# Patient Record
Sex: MALE | Race: WHITE | NOT HISPANIC OR LATINO | Employment: FULL TIME | URBAN - METROPOLITAN AREA
[De-identification: names, ages, dates, MRNs, and addresses within clinical notes are randomized per-mention and may not be internally consistent; named-entity substitution may affect disease eponyms.]

---

## 2018-01-31 ENCOUNTER — HOSPITAL ENCOUNTER (OUTPATIENT)
Dept: RADIOLOGY | Facility: OTHER | Age: 68
Discharge: HOME | End: 2018-01-31
Attending: INTERNAL MEDICINE
Payer: COMMERCIAL

## 2018-01-31 DIAGNOSIS — R63.4 ABNORMAL WEIGHT LOSS: ICD-10-CM

## 2018-01-31 DIAGNOSIS — R63.4 ABNORMAL WEIGHT LOSS: Primary | ICD-10-CM

## 2018-01-31 PROCEDURE — 71046 X-RAY EXAM CHEST 2 VIEWS: CPT | Mod: TC,FY

## 2018-01-31 PROCEDURE — 71046 X-RAY EXAM CHEST 2 VIEWS: CPT | Mod: 26,,, | Performed by: RADIOLOGY

## 2018-09-05 ENCOUNTER — OFFICE VISIT (OUTPATIENT)
Dept: SPORTS MEDICINE | Facility: CLINIC | Age: 68
End: 2018-09-05
Payer: COMMERCIAL

## 2018-09-05 VITALS
DIASTOLIC BLOOD PRESSURE: 72 MMHG | HEART RATE: 62 BPM | WEIGHT: 185 LBS | BODY MASS INDEX: 25.06 KG/M2 | HEIGHT: 72 IN | SYSTOLIC BLOOD PRESSURE: 142 MMHG

## 2018-09-05 DIAGNOSIS — M19.012 BILATERAL SHOULDER REGION ARTHRITIS: Primary | ICD-10-CM

## 2018-09-05 DIAGNOSIS — M19.012 GLENOHUMERAL ARTHRITIS, LEFT: ICD-10-CM

## 2018-09-05 DIAGNOSIS — M19.011 GLENOHUMERAL ARTHRITIS, RIGHT: ICD-10-CM

## 2018-09-05 DIAGNOSIS — M19.011 BILATERAL SHOULDER REGION ARTHRITIS: Primary | ICD-10-CM

## 2018-09-05 PROCEDURE — 99204 OFFICE O/P NEW MOD 45 MIN: CPT | Mod: S$GLB,,, | Performed by: ORTHOPAEDIC SURGERY

## 2018-09-05 PROCEDURE — 99999 PR PBB SHADOW E&M-EST. PATIENT-LVL III: CPT | Mod: PBBFAC,,, | Performed by: ORTHOPAEDIC SURGERY

## 2018-09-05 PROCEDURE — 1101F PT FALLS ASSESS-DOCD LE1/YR: CPT | Mod: CPTII,S$GLB,, | Performed by: ORTHOPAEDIC SURGERY

## 2018-09-05 NOTE — PROGRESS NOTES
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 Randolph Ibrahim, reports that the pain is severe and not responding to any conservative care.    He points to the issa-lateral shoulders as the location of his pain. Pain has been present for several years. He is an avid weight  and former heavy weight . Has had multiple PRP and mesenchymal stem cell injections into the bilateral shoulders. He reports that the pain is worse with overhead activity. It also bothers him at night. The patient reports he has known shoulder arthritis and would like to return to sport as soon as possible with as little down time as possible.     Is affecting ADLs.     He was previously seen by Dr. Keanu Tang who recommended humeral head resurfacing to optimize his return to competition.     Review of Systems   Constitution: Negative. Negative for chills, fever and night sweats.   HENT: Negative for congestion and headaches.    Eyes: Negative for blurred vision, left vision loss and right vision loss.   Cardiovascular: Negative for chest pain and syncope.   Respiratory: Negative for cough and shortness of breath.    Endocrine: Negative for polydipsia, polyphagia and polyuria.   Hematologic/Lymphatic: Negative for bleeding problem. Does not bruise/bleed easily.   Skin: Negative for dry skin, itching and rash.   Musculoskeletal: Negative for falls and muscle weakness.   Gastrointestinal: Negative for abdominal pain and bowel incontinence.   Genitourinary: Negative for bladder incontinence and nocturia.   Neurological: Negative for disturbances in coordination, loss of balance and seizures.   Psychiatric/Behavioral: Negative for depression. The patient does not have insomnia.    Allergic/Immunologic: Negative for hives and persistent infections.     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    Empty sella     Family history of ischemic heart disease 1/14/2014    Hypogonadism male 2004    Hypothyroidism 1/14/2014    Diagnosed 2003.    Nonspecific abnormal results of endocrine function study 6/20/2014     PAST SURGICAL HISTORY:   Past Surgical History:   Procedure Laterality Date    HAIR TRANSPLANT      TONSILLECTOMY, ADENOIDECTOMY  1957     FAMILY HISTORY:   Family History   Problem Relation Age of Onset    Cancer Mother 58        breast mets to lung    Heart disease Father     Cancer Sister     Cancer Brother         hodgkins lymphoma    Diabetes Son         type 1    Cancer Brother         bladder     SOCIAL HISTORY:   Social History     Socioeconomic History    Marital status:      Spouse name: Not on file    Number of children: Not on file    Years of education: Not on file    Highest education level: Not on file   Social Needs    Financial resource strain: Not on file    Food insecurity - worry: Not on file    Food insecurity - inability: Not on file    Transportation needs - medical: Not on file    Transportation needs - non-medical: Not on file   Occupational History    Not on file   Tobacco Use    Smoking status: Never Smoker   Substance and Sexual Activity    Alcohol use: Yes     Alcohol/week: 3.6 oz     Types: 4 Glasses of wine, 2 Shots of liquor per week     Comment: Socially.    Drug use: No    Sexual activity: Yes     Partners: Female   Other Topics Concern    Not on file   Social History Narrative    Exercises (weight lifting). Very little cardio. 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 , Disp: , Rfl: 12    multivitamin-zinc oxide (ADEKS) 7.5 mg Chew, Take 1 tablet by mouth once daily. Life extension mix, Disp: , Rfl:      selenium 200 mcg Tab, Take 2 tablets by mouth once daily., Disp: , Rfl:     testosterone cypionate (DEPOTESTOTERONE CYPIONATE) 100 mg/mL injection, Inject 100 mg into the muscle once a week. , Disp: , Rfl:     tyrosine 500 mg Cap, Take 1,000 mcg by mouth once daily., Disp: , Rfl:     anastrozole (ARIMIDEX) 1 mg Tab, Take 1 tablet by mouth once daily., Disp: , Rfl:     atorvastatin (LIPITOR) 20 MG tablet, Take 1 tablet (20 mg total) by mouth once daily., Disp: 90 tablet, Rfl: 3    AVODART 0.5 mg capsule, Take 1 capsule by mouth once daily., Disp: , Rfl:     BORON ORAL, Take 6 mg by mouth once daily., Disp: , Rfl:     cholecalciferol, vitamin D3, (VITAMIN D3) 5,000 unit Tab, Take 5,000 Units by mouth once daily., Disp: , Rfl:     coenzyme Q10 (CO Q-10) 400 mg Cap, Take 1 capsule by mouth once daily., Disp: , Rfl:     fish oil-fat acid comb.8-hb137 (OMEGA 3-6-9) 1,200 mg Cap, Take 1 capsule by mouth once daily., Disp: , Rfl:     GREEN TEA LEAF & TEA LEAF EXTR (GREEN TEA COMPLEX ORAL), Take by mouth., Disp: , Rfl:     hydrocodone-acetaminophen 5-325mg (NORCO) 5-325 mg per tablet, , Disp: , Rfl: 0    levofloxacin (LEVAQUIN) 500 MG tablet, Take 500 mg by mouth once daily., Disp: , Rfl: 1    lorazepam (ATIVAN) 1 MG tablet, Take 1 mg by mouth every 6 (six) hours as needed., Disp: , Rfl:     metformin (GLUCOPHAGE) 1000 MG tablet, Take 1 tablet by mouth 2 (two) times daily., Disp: , Rfl:     methylPREDNISolone (MEDROL DOSEPACK) 4 mg tablet, , Disp: , Rfl: 0    milk thistle 175 mg tablet, Take 175 mg by mouth once daily., Disp: , Rfl:     oxycodone-acetaminophen (PERCOCET)  mg per tablet, Take 1 tablet by mouth every 4 to 6 hours as needed., Disp: , Rfl:     thyroid, pork, (ARMOUR THYROID) 120 mg Tab, 135 mg daily (2 and a quarter tabs), Disp: 70 tablet, Rfl: 12    tretinoin (RETIN-A) 0.1 % cream, , Disp: , Rfl: 3    triamcinolone acetonide 0.1% (KENALOG) 0.1 % cream, Apply 1 application  topically 2 (two) times daily., Disp: , Rfl:     UNABLE TO FIND, medication name: adrenal DMG supplement; sea iodine 1000mcg supplement; celegen supplement., Disp: , Rfl:     zinc gluconate 50 mg tablet, Take 50 mg by mouth once daily., Disp: , Rfl:     zolpidem (AMBIEN) 10 mg Tab, Take 1 tablet by mouth nightly as needed., Disp: , Rfl:   ALLERGIES: Review of patient's allergies indicates:  No Known Allergies    VITAL SIGNS: BP (!) 142/72   Pulse 62   Ht 6' (1.829 m)   Wt 83.9 kg (185 lb)   BMI 25.09 kg/m²      PHYSICAL EXAMINATION:  General:  The patient is alert and oriented x 3.  Mood is pleasant.  Observation of ears, eyes and nose reveal no gross abnormalities.  No labored breathing observed.  Gait is coordinated. Patient can toe walk and heel walk without difficulty.      bilateral Shoulder / Upper Extremity Exam    OBSERVATION:     Swelling  none  Deformity  none   Discoloration  none   Scapular winging none   Scars   none  Atrophy  none    TENDERNESS / CREPITUS (T/C):          T/C      T/C   Clavicle   -/-  SUPRAspinatus    -/-     AC Jt.    -/-  INFRAspinatus  -/-    SC Jt.    -/-  Deltoid    -/-      G. Tuberosity  -/-  LH BICEP groove  Mild +/-   Acromion:  -/-  Midline Neck   -/-     Scapular Spine -/-  Trapezium   -/-   SMA Scapula  -/-  GH jt. line - post  -/-     Scapulothoracic  -/-         ROM: (* = with pain)  Right shoulder   Left shoulder        AROM (PROM)   AROM (PROM)   FE    110° (110°)     110° (110°)     ER at 0°    10°  (10°)    10°  (10°)    ER at 90° ABD  90°  (90°)    90°  (90°)    IR at 90°  ABD   NA  (40°)     NA  (40°)      IR (spine level)   L5     L5     STRENGTH: (* = with pain) RIGHT SHOULDER  LEFT SHOULDER   SCAPTION at 0  5/5    5/5   SCAPTION at 30  5/5    5/5    IR    5/5    5/5   ER    5/5    5/5   BICEPS   5/5    5/5   Deltoid    5/5    5/5     SIGNS:  Painful side       NEER   +    OVALERIAS  neg    AVILES   +    SPEEDS  neg     DROP ARM   neg   BELLY  PRESS neg   Superior escape none    LIFT-OFF  neg   X-Body ADD    neg    MOVING VALGUS neg        STABILITY TESTING    RIGHT SHOULDER   LEFT SHOULDER     Translation     Anterior  up face     up face    Posterior  up face    up face    Sulcus   < 10mm    < 10 mm     Signs   Apprehension   neg      neg       Relocation   no change     no change      Jerk test  neg     neg    EXTREMITY NEURO-VASCULAR EXAM    Sensation grossly intact to light touch all dermatomal regions.    DTR 2+ Biceps, Triceps, BR and Negative Ludmilas sign   Grossly intact motor function at Elbow, Wrist and Hand   Distal pulses radial and ulnar 2+, brisk cap refill, symmetric.      NECK:  Painless FROM and spinous processes non-tender. Negative Spurlings sign.      OTHER FINDINGS:  Crepitation with shoulder ROM    XRAYS:  Shoulder trauma series bilateral,  were obtained and reviewed  No convincing fracture or dislocation is noted. The osseous structures appear well mineralized and well aligned    MRI:  Bilateral outside MRI reviewed by me demonstrating severe glenohumeral arthritis with intact rotator cuff muscles.         ASSESSMENT:    Bilateral shoulder osteoarthritis with intact rotator cuff     PLAN:        We discussed both operative and non operative treatment options  Given the patient's desire to return to weight lifting, we recommended resurfacing of the glenoid and humeral head simultaneously to address both sources of the patients pain. We would use [employer] implants and instrumentation   We explained that this would improve his pain and range of motion.   We also discussed possible Bio-D injection to help with pain relief  We discussed anatomic total shoulder arthroplasty, however we explained that he would have post operative limitations include limiting weight lifting to under 50 lbs.   He would sling immobilization for 6 weeks and require rehab. He would likely be able to return to weight lifting in 3-4 months    He would like  to proceed with Bio-D injection. We will get this scheduled and contact the patient to arrange this.

## 2018-09-05 NOTE — PROGRESS NOTES
Bilateral shoulder osteoarthritis with intact rotator cuff     We discussed both operative and non operative treatment options  Given the patient's desire to return to weight lifting, we recommended resurfacing of the glenoid and humeral head simultaneously to address both sources of the patients pain.   We explained that this would improve his pain and range of motion.   We also discussed possible Bio-D injection to help with pain relief  We discussed anatomic total shoulder arthroplasty, however we explained that he would have post operative limitations include limiting weight lifting to under 50 lbs.   He would sling immobilization for 6 weeks and require rehab. He would likely be able to return to weight lifting in 3-4 months    He would like to proceed with Bio-D injection. We will get this scheduled and contact the patient to arrange this.

## 2018-09-19 ENCOUNTER — OFFICE VISIT (OUTPATIENT)
Dept: SPORTS MEDICINE | Facility: CLINIC | Age: 68
End: 2018-09-19
Payer: COMMERCIAL

## 2018-09-19 ENCOUNTER — HOSPITAL ENCOUNTER (OUTPATIENT)
Dept: RADIOLOGY | Facility: HOSPITAL | Age: 68
Discharge: HOME | End: 2018-09-19
Attending: ORTHOPAEDIC SURGERY
Payer: COMMERCIAL

## 2018-09-19 VITALS
DIASTOLIC BLOOD PRESSURE: 61 MMHG | BODY MASS INDEX: 25.06 KG/M2 | SYSTOLIC BLOOD PRESSURE: 129 MMHG | HEIGHT: 72 IN | WEIGHT: 185 LBS | HEART RATE: 65 BPM

## 2018-09-19 DIAGNOSIS — M25.511 BILATERAL SHOULDER PAIN, UNSPECIFIED CHRONICITY: ICD-10-CM

## 2018-09-19 DIAGNOSIS — M25.512 BILATERAL SHOULDER PAIN, UNSPECIFIED CHRONICITY: ICD-10-CM

## 2018-09-19 DIAGNOSIS — M19.012 OSTEOARTHRITIS OF BOTH SHOULDERS, UNSPECIFIED OSTEOARTHRITIS TYPE: ICD-10-CM

## 2018-09-19 DIAGNOSIS — M25.512 BILATERAL SHOULDER PAIN, UNSPECIFIED CHRONICITY: Primary | ICD-10-CM

## 2018-09-19 DIAGNOSIS — M19.011 OSTEOARTHRITIS OF BOTH SHOULDERS, UNSPECIFIED OSTEOARTHRITIS TYPE: ICD-10-CM

## 2018-09-19 DIAGNOSIS — M25.511 BILATERAL SHOULDER PAIN, UNSPECIFIED CHRONICITY: Primary | ICD-10-CM

## 2018-09-19 PROCEDURE — 73020 X-RAY EXAM OF SHOULDER: CPT | Mod: 26,LT,, | Performed by: RADIOLOGY

## 2018-09-19 PROCEDURE — 73020 X-RAY EXAM OF SHOULDER: CPT | Mod: TC,FY,PO,RT

## 2018-09-19 PROCEDURE — 73020 X-RAY EXAM OF SHOULDER: CPT | Mod: TC,FY,PO,LT

## 2018-09-19 PROCEDURE — 73020 X-RAY EXAM OF SHOULDER: CPT | Mod: 26,RT,, | Performed by: RADIOLOGY

## 2018-09-19 PROCEDURE — 99999 PR PBB SHADOW E&M-EST. PATIENT-LVL V: CPT | Mod: PBBFAC,,, | Performed by: ORTHOPAEDIC SURGERY

## 2018-09-19 NOTE — PROGRESS NOTES
Bilateral shoulder osteoarthritis     PROCEDURE NOTE:   After consent and post-injection information was given, the shoulder was prepped with betadyne. The bilateral glenohumeral joint of the shoulder was injected with 1 cc BioD and 5 cc saline under US guidance. Bandaid was applied. Patient was reminded to rest and to call clinic immediately for any adverse side effects as explained in clinic today.    All questions were answered, pt will contact us for questions or concerns in the interim.

## 2018-11-22 ENCOUNTER — HOSPITAL ENCOUNTER (EMERGENCY)
Facility: HOSPITAL | Age: 68
Discharge: HOME | End: 2018-11-23
Attending: EMERGENCY MEDICINE
Payer: COMMERCIAL

## 2018-11-22 DIAGNOSIS — R00.2 PALPITATIONS: ICD-10-CM

## 2018-11-22 DIAGNOSIS — I47.10 SVT (SUPRAVENTRICULAR TACHYCARDIA): Primary | ICD-10-CM

## 2018-11-22 DIAGNOSIS — R00.0 TACHYCARDIA: ICD-10-CM

## 2018-11-22 DIAGNOSIS — R07.9 CHEST PAIN: ICD-10-CM

## 2018-11-22 PROCEDURE — 96374 THER/PROPH/DIAG INJ IV PUSH: CPT

## 2018-11-22 PROCEDURE — 93005 ELECTROCARDIOGRAM TRACING: CPT

## 2018-11-22 PROCEDURE — 99284 EMERGENCY DEPT VISIT MOD MDM: CPT | Mod: 25

## 2018-11-22 PROCEDURE — 96361 HYDRATE IV INFUSION ADD-ON: CPT

## 2018-11-22 PROCEDURE — 93010 ELECTROCARDIOGRAM REPORT: CPT | Mod: ,,, | Performed by: INTERNAL MEDICINE

## 2018-11-22 PROCEDURE — 96376 TX/PRO/DX INJ SAME DRUG ADON: CPT

## 2018-11-23 VITALS
HEART RATE: 58 BPM | SYSTOLIC BLOOD PRESSURE: 110 MMHG | TEMPERATURE: 98 F | OXYGEN SATURATION: 97 % | HEIGHT: 72 IN | RESPIRATION RATE: 18 BRPM | DIASTOLIC BLOOD PRESSURE: 54 MMHG | BODY MASS INDEX: 25.06 KG/M2 | WEIGHT: 185 LBS

## 2018-11-23 LAB
ALBUMIN SERPL BCP-MCNC: 3.9 G/DL
ALP SERPL-CCNC: 46 U/L
ALT SERPL W/O P-5'-P-CCNC: 36 U/L
ANION GAP SERPL CALC-SCNC: 9 MMOL/L
AST SERPL-CCNC: 33 U/L
BASOPHILS # BLD AUTO: 0.05 K/UL
BASOPHILS NFR BLD: 0.5 %
BILIRUB SERPL-MCNC: 0.3 MG/DL
BUN SERPL-MCNC: 21 MG/DL
CALCIUM SERPL-MCNC: 9.8 MG/DL
CHLORIDE SERPL-SCNC: 104 MMOL/L
CO2 SERPL-SCNC: 27 MMOL/L
CREAT SERPL-MCNC: 1.5 MG/DL
DIFFERENTIAL METHOD: ABNORMAL
EOSINOPHIL # BLD AUTO: 0.4 K/UL
EOSINOPHIL NFR BLD: 4.6 %
ERYTHROCYTE [DISTWIDTH] IN BLOOD BY AUTOMATED COUNT: 16 %
EST. GFR  (AFRICAN AMERICAN): 55 ML/MIN/1.73 M^2
EST. GFR  (NON AFRICAN AMERICAN): 47 ML/MIN/1.73 M^2
GLUCOSE SERPL-MCNC: 102 MG/DL
HCT VFR BLD AUTO: 46.4 %
HGB BLD-MCNC: 15.5 G/DL
LYMPHOCYTES # BLD AUTO: 1.8 K/UL
LYMPHOCYTES NFR BLD: 19.5 %
MCH RBC QN AUTO: 27.4 PG
MCHC RBC AUTO-ENTMCNC: 33.4 G/DL
MCV RBC AUTO: 82 FL
MONOCYTES # BLD AUTO: 1 K/UL
MONOCYTES NFR BLD: 10.3 %
NEUTROPHILS # BLD AUTO: 6 K/UL
NEUTROPHILS NFR BLD: 65 %
PLATELET # BLD AUTO: 194 K/UL
PMV BLD AUTO: 10.4 FL
POTASSIUM SERPL-SCNC: 4.3 MMOL/L
PROT SERPL-MCNC: 6.7 G/DL
RBC # BLD AUTO: 5.65 M/UL
SODIUM SERPL-SCNC: 140 MMOL/L
TROPONIN I SERPL DL<=0.01 NG/ML-MCNC: 0.02 NG/ML
TSH SERPL DL<=0.005 MIU/L-ACNC: 2.85 UIU/ML
WBC # BLD AUTO: 9.19 K/UL

## 2018-11-23 PROCEDURE — 63600175 PHARM REV CODE 636 W HCPCS: Performed by: EMERGENCY MEDICINE

## 2018-11-23 PROCEDURE — 80053 COMPREHEN METABOLIC PANEL: CPT

## 2018-11-23 PROCEDURE — 93005 ELECTROCARDIOGRAM TRACING: CPT

## 2018-11-23 PROCEDURE — 84484 ASSAY OF TROPONIN QUANT: CPT

## 2018-11-23 PROCEDURE — 93010 ELECTROCARDIOGRAM REPORT: CPT | Mod: ,,, | Performed by: INTERNAL MEDICINE

## 2018-11-23 PROCEDURE — 85025 COMPLETE CBC W/AUTO DIFF WBC: CPT

## 2018-11-23 PROCEDURE — 84443 ASSAY THYROID STIM HORMONE: CPT

## 2018-11-23 PROCEDURE — 25000003 PHARM REV CODE 250: Performed by: EMERGENCY MEDICINE

## 2018-11-23 RX ORDER — LORAZEPAM 2 MG/ML
0.5 INJECTION INTRAMUSCULAR
Status: COMPLETED | OUTPATIENT
Start: 2018-11-23 | End: 2018-11-23

## 2018-11-23 RX ORDER — METOPROLOL TARTRATE 1 MG/ML
5 INJECTION, SOLUTION INTRAVENOUS
Status: DISCONTINUED | OUTPATIENT
Start: 2018-11-23 | End: 2018-11-23

## 2018-11-23 RX ORDER — METOPROLOL TARTRATE 1 MG/ML
5 INJECTION, SOLUTION INTRAVENOUS
Status: DISCONTINUED | OUTPATIENT
Start: 2018-11-23 | End: 2018-11-23 | Stop reason: HOSPADM

## 2018-11-23 RX ORDER — SODIUM CHLORIDE 9 MG/ML
1000 INJECTION, SOLUTION INTRAVENOUS
Status: COMPLETED | OUTPATIENT
Start: 2018-11-23 | End: 2018-11-23

## 2018-11-23 RX ORDER — LORAZEPAM 0.5 MG/1
0.5 TABLET ORAL EVERY 8 HOURS PRN
Qty: 15 TABLET | Refills: 0 | Status: SHIPPED | OUTPATIENT
Start: 2018-11-23 | End: 2018-12-23

## 2018-11-23 RX ADMIN — LORAZEPAM 0.5 MG: 2 INJECTION INTRAMUSCULAR; INTRAVENOUS at 01:11

## 2018-11-23 RX ADMIN — LORAZEPAM 0.5 MG: 2 INJECTION INTRAMUSCULAR; INTRAVENOUS at 12:11

## 2018-11-23 RX ADMIN — SODIUM CHLORIDE 1000 ML: 0.9 INJECTION, SOLUTION INTRAVENOUS at 12:11

## 2018-11-23 NOTE — ED TRIAGE NOTES
Pt to ED with complaints of heart palpitations that started around 8:30pm tonight after getting upset about a personal matter.  Denies dizziness, chest pain, lightheadiness, or SOB.

## 2018-11-23 NOTE — ED PROVIDER NOTES
"Encounter Date: 11/22/2018    SCRIBE #1 NOTE: I, Sonia Kothari, am scribing for, and in the presence of,  Tere Mendez MD. I have scribed the following portions of the note - Other sections scribed: HPI, ROS, PE.       History     Chief Complaint   Patient presents with    Palpitations     Pt reports "Heart palpitations that started about 2030 tonight".  Does report "Heart issues".      CC: Palpitations    HPI: This is a 69 y/o male with PMHx of Empty sella, Hypogonadism male, and Hypothyroidism who presents to the ED for emergent evaluation of acute onset palpitations that began ar 8:30 PM today. Pt denies any pain. Pt states that he was put on a cardiac monitor by cardiologist, Dr. Tru Martinez in [place], [place], for 1 week and just recently taken off. Pt states that his next appt is on 11/26 to discuss findings from monitoring. Pt believes that he has SVT. Dr. Martinez recently increased pt's Metoprolol to 50 mg BID. Pt reports that he also took 20 mg Micardis x2 hrs PTA due to seeing that his BP was elevated. Pt notes that he only takes Micardis as needed. Pt denies SOB, dizziness, chest pain, or SOB. No further symptoms reported.      The history is provided by the patient. No  was used.     Review of patient's allergies indicates:  No Known Allergies  Past Medical History:   Diagnosis Date    Androgens and anabolic congeners causing adverse effect in therapeutic use     Chest pain, unspecified 1/14/2014 12/2013: Left sided chest pain.    Empty sella     Family history of ischemic heart disease 1/14/2014    Hypogonadism male 2004    Hypothyroidism 1/14/2014    Diagnosed 2003.    Nonspecific abnormal results of endocrine function study 6/20/2014     Past Surgical History:   Procedure Laterality Date    HAIR TRANSPLANT      TONSILLECTOMY, ADENOIDECTOMY  1957     Family History   Problem Relation Age of Onset    Cancer Mother 58        breast mets to lung    Heart disease " Father     Cancer Sister     Cancer Brother         hodgkins lymphoma    Diabetes Son         type 1    Cancer Brother         bladder     Social History     Tobacco Use    Smoking status: Never Smoker    Smokeless tobacco: Never Used   Substance Use Topics    Alcohol use: Yes     Alcohol/week: 3.6 oz     Types: 4 Glasses of wine, 2 Shots of liquor per week     Comment: Socially.    Drug use: No     Review of Systems   Constitutional: Negative for chills and fever.   HENT: Negative for congestion, ear pain, rhinorrhea and sore throat.    Eyes: Negative for pain and visual disturbance.   Respiratory: Negative for cough, choking, chest tightness and shortness of breath.    Cardiovascular: Positive for palpitations. Negative for chest pain and leg swelling.   Gastrointestinal: Negative for abdominal pain, diarrhea, nausea and vomiting.   Genitourinary: Negative for dysuria.   Musculoskeletal: Negative for back pain and neck pain.   Skin: Negative for rash.   Neurological: Negative for headaches.   All other systems reviewed and are negative.      Physical Exam     Initial Vitals [11/22/18 2341]   BP Pulse Resp Temp SpO2   117/78 (!) 143 16 98.5 °F (36.9 °C) 97 %      MAP       --         Physical Exam    Nursing note and vitals reviewed.  Constitutional: Vital signs are normal. He appears well-developed and well-nourished. He is active.  Non-toxic appearance. No distress.   HENT:   Head: Normocephalic and atraumatic.   Eyes: EOM are normal.   Neck: Trachea normal. Neck supple.   Cardiovascular: Regular rhythm. Tachycardia present.    Tachycardic, regular   Pulmonary/Chest: Breath sounds normal. No respiratory distress.   Abdominal: Soft. Normal appearance and bowel sounds are normal. He exhibits no distension. There is no tenderness.   Musculoskeletal: Normal range of motion. He exhibits no edema.   Neurological: He is alert.   Skin: Skin is warm, dry and intact.   Psychiatric: His mood appears anxious.          ED Course   Critical Care  Date/Time: 11/23/2018 4:40 AM  Performed by: Tere Mendez MD  Authorized by: Tere Mendez MD   Direct patient critical care time: 10 minutes  Ordering / reviewing critical care time: 10 minutes  Documentation critical care time: 5 minutes  Consulting other physicians critical care time: 5 minutes  Total critical care time (exclusive of procedural time) : 30 minutes        Labs Reviewed   CBC W/ AUTO DIFFERENTIAL - Abnormal; Notable for the following components:       Result Value    RDW 16.0 (*)     All other components within normal limits   COMPREHENSIVE METABOLIC PANEL - Abnormal; Notable for the following components:    Creatinine 1.5 (*)     Alkaline Phosphatase 46 (*)     eGFR if  55 (*)     eGFR if non  47 (*)     All other components within normal limits   TROPONIN I   TSH     EKG Readings: (Independently Interpreted)   Initial Reading: No STEMI. Rhythm: Supraventricular Tachycardia (SVT). Ectopy: No Ectopy.       Imaging Results          X-Ray Chest AP Portable (Final result)  Result time 11/23/18 00:43:50    Final result by Piper Norris MD (11/23/18 00:43:50)                 Impression:      No acute intrathoracic abnormality identified on this single radiographic view of the chest.      Electronically signed by: Piper Norris MD  Date:    11/23/2018  Time:    00:43             Narrative:    EXAMINATION:  XR CHEST AP PORTABLE    CLINICAL HISTORY:  Tachycardia, unspecified    TECHNIQUE:  Single frontal view of the chest was performed.    COMPARISON:  01/31/2018    FINDINGS:  Cardiac monitoring leads overlie the chest.  The cardiomediastinal silhouette is within normal limits. The visualized airway is unremarkable.  The lungs appear symmetrically expanded with right basilar subsegmental atelectasis.  No focal consolidation, large pleural effusion or pneumothorax is appreciated.Visualized osseous structures demonstrate no acute  abnormality.                                 Medical Decision Making:   Clinical Tests:   Lab Tests: Ordered and Reviewed  Radiological Study: Ordered and Reviewed  ED Management:  , suspect svt but pt quite anxious. Given ativan 0.5mg iv and 1L NS. I was speaking with pt and he was noted by me to have a pause and seeming break with very transient sinus, then HR 160s for a couple of minutes then broke to sinus with HR in 50s. In the interim I spoke w Dr. Coulter. Metoprolol ordered iv but never given b/c pt converted to sinus spontaneously. No further dysrhythmia noted.   Pt in ED for over an hour in sinus. Will be fully compliant with his metoprolol and will f/u w cardiology in a couple of days. We discussed home care and worrisome signs that should prompt need to return to er should they occur. He adamantly wants to go home. There is no indication for further emergent intervention or evaluation at this time.               Scribe Attestation:   Scribe #1: I performed the above scribed service and the documentation accurately describes the services I performed. I attest to the accuracy of the note.    Attending Attestation:           Physician Attestation for Scribe:  Physician Attestation Statement for Scribe #1: I, Tere Mendez MD, reviewed documentation, as scribed by Sonia Kothari in my presence, and it is both accurate and complete.                    Clinical Impression:   The primary encounter diagnosis was SVT (supraventricular tachycardia). Diagnoses of Palpitations, Tachycardia, and Chest pain were also pertinent to this visit.                             Tere Mendez MD  11/23/18 5904

## 2018-11-23 NOTE — DISCHARGE INSTRUCTIONS
Your heart rate today was initially in the 140s with an appearance of svt. With some minimal ativan, IV fluids and while talking, your tachycardia broke to sinus then quickly rebounded to the 160s, svt, then broke again spontaneously. It remained in sinus for over one hour, HR 50s, SBP 120s. You were given 2 low doses of ativan but the metoprolol IV that was planned was held due to spontaneously and sustained resolution to sinus.   Rest. Drink plenty of fluids. Take the metoprolol 50mg po bid without fail. Follow up with cardiology. Return for any new or acute problems or concerns.

## 2019-03-20 PROBLEM — I47.10 SVT (SUPRAVENTRICULAR TACHYCARDIA): Status: ACTIVE | Noted: 2019-03-20

## 2019-03-20 PROBLEM — I48.0 PAF (PAROXYSMAL ATRIAL FIBRILLATION): Status: ACTIVE | Noted: 2019-03-20

## 2021-08-20 ENCOUNTER — TELEPHONE (OUTPATIENT)
Dept: PAIN MEDICINE | Facility: CLINIC | Age: 71
End: 2021-08-20

## 2021-09-15 ENCOUNTER — TELEPHONE (OUTPATIENT)
Dept: PAIN MEDICINE | Facility: CLINIC | Age: 71
End: 2021-09-15

## 2021-09-16 ENCOUNTER — HOSPITAL ENCOUNTER (OUTPATIENT)
Dept: RADIOLOGY | Facility: OTHER | Age: 71
Discharge: HOME | End: 2021-09-16
Attending: ANESTHESIOLOGY
Payer: COMMERCIAL

## 2021-09-16 ENCOUNTER — OFFICE VISIT (OUTPATIENT)
Dept: PAIN MEDICINE | Facility: CLINIC | Age: 71
End: 2021-09-16
Attending: ANESTHESIOLOGY
Payer: COMMERCIAL

## 2021-09-16 VITALS
BODY MASS INDEX: 26.01 KG/M2 | SYSTOLIC BLOOD PRESSURE: 142 MMHG | WEIGHT: 186.5 LBS | DIASTOLIC BLOOD PRESSURE: 72 MMHG | HEART RATE: 42 BPM

## 2021-09-16 DIAGNOSIS — M25.561 CHRONIC PAIN OF BOTH KNEES: ICD-10-CM

## 2021-09-16 DIAGNOSIS — M25.562 CHRONIC PAIN OF BOTH KNEES: ICD-10-CM

## 2021-09-16 DIAGNOSIS — G89.4 CHRONIC PAIN SYNDROME: ICD-10-CM

## 2021-09-16 DIAGNOSIS — M17.0 PRIMARY OSTEOARTHRITIS OF BOTH KNEES: ICD-10-CM

## 2021-09-16 DIAGNOSIS — M19.011 PRIMARY OSTEOARTHRITIS OF BOTH SHOULDERS: ICD-10-CM

## 2021-09-16 DIAGNOSIS — G89.29 CHRONIC PAIN OF BOTH KNEES: ICD-10-CM

## 2021-09-16 DIAGNOSIS — M19.012 PRIMARY OSTEOARTHRITIS OF BOTH SHOULDERS: ICD-10-CM

## 2021-09-16 DIAGNOSIS — M17.0 PRIMARY OSTEOARTHRITIS OF BOTH KNEES: Primary | ICD-10-CM

## 2021-09-16 PROCEDURE — 99999 PR PBB SHADOW E&M-EST. PATIENT-LVL III: CPT | Mod: PBBFAC,,, | Performed by: ANESTHESIOLOGY

## 2021-09-16 PROCEDURE — 3077F PR MOST RECENT SYSTOLIC BLOOD PRESSURE >= 140 MM HG: ICD-10-PCS | Mod: CPTII,S$GLB,, | Performed by: ANESTHESIOLOGY

## 2021-09-16 PROCEDURE — 3008F PR BODY MASS INDEX (BMI) DOCUMENTED: ICD-10-PCS | Mod: CPTII,S$GLB,, | Performed by: ANESTHESIOLOGY

## 2021-09-16 PROCEDURE — 1159F MED LIST DOCD IN RCRD: CPT | Mod: CPTII,S$GLB,, | Performed by: ANESTHESIOLOGY

## 2021-09-16 PROCEDURE — 1160F PR REVIEW ALL MEDS BY PRESCRIBER/CLIN PHARMACIST DOCUMENTED: ICD-10-PCS | Mod: CPTII,S$GLB,, | Performed by: ANESTHESIOLOGY

## 2021-09-16 PROCEDURE — 73030 X-RAY EXAM OF SHOULDER: CPT | Mod: 26,,, | Performed by: RADIOLOGY

## 2021-09-16 PROCEDURE — 1159F PR MEDICATION LIST DOCUMENTED IN MEDICAL RECORD: ICD-10-PCS | Mod: CPTII,S$GLB,, | Performed by: ANESTHESIOLOGY

## 2021-09-16 PROCEDURE — 1125F AMNT PAIN NOTED PAIN PRSNT: CPT | Mod: CPTII,S$GLB,, | Performed by: ANESTHESIOLOGY

## 2021-09-16 PROCEDURE — 73030 X-RAY EXAM OF SHOULDER: CPT | Mod: TC,50,FY

## 2021-09-16 PROCEDURE — 73562 X-RAY EXAM OF KNEE 3: CPT | Mod: 26,,, | Performed by: RADIOLOGY

## 2021-09-16 PROCEDURE — 99204 OFFICE O/P NEW MOD 45 MIN: CPT | Mod: S$GLB,,, | Performed by: ANESTHESIOLOGY

## 2021-09-16 PROCEDURE — 3077F SYST BP >= 140 MM HG: CPT | Mod: CPTII,S$GLB,, | Performed by: ANESTHESIOLOGY

## 2021-09-16 PROCEDURE — 99204 PR OFFICE/OUTPT VISIT, NEW, LEVL IV, 45-59 MIN: ICD-10-PCS | Mod: S$GLB,,, | Performed by: ANESTHESIOLOGY

## 2021-09-16 PROCEDURE — 4010F ACE/ARB THERAPY RXD/TAKEN: CPT | Mod: CPTII,S$GLB,, | Performed by: ANESTHESIOLOGY

## 2021-09-16 PROCEDURE — 73562 X-RAY EXAM OF KNEE 3: CPT | Mod: TC,50,FY

## 2021-09-16 PROCEDURE — 3078F DIAST BP <80 MM HG: CPT | Mod: CPTII,S$GLB,, | Performed by: ANESTHESIOLOGY

## 2021-09-16 PROCEDURE — 73030 XR SHOULDER COMPLETE 2 OR MORE VIEWS BILATERAL: ICD-10-PCS | Mod: 26,,, | Performed by: RADIOLOGY

## 2021-09-16 PROCEDURE — 4010F PR ACE/ARB THEARPY RXD/TAKEN: ICD-10-PCS | Mod: CPTII,S$GLB,, | Performed by: ANESTHESIOLOGY

## 2021-09-16 PROCEDURE — 1101F PR PT FALLS ASSESS DOC 0-1 FALLS W/OUT INJ PAST YR: ICD-10-PCS | Mod: CPTII,S$GLB,, | Performed by: ANESTHESIOLOGY

## 2021-09-16 PROCEDURE — 73562 XR KNEE 3 VIEW BILATERAL: ICD-10-PCS | Mod: 26,,, | Performed by: RADIOLOGY

## 2021-09-16 PROCEDURE — 3078F PR MOST RECENT DIASTOLIC BLOOD PRESSURE < 80 MM HG: ICD-10-PCS | Mod: CPTII,S$GLB,, | Performed by: ANESTHESIOLOGY

## 2021-09-16 PROCEDURE — 1101F PT FALLS ASSESS-DOCD LE1/YR: CPT | Mod: CPTII,S$GLB,, | Performed by: ANESTHESIOLOGY

## 2021-09-16 PROCEDURE — 1125F PR PAIN SEVERITY QUANTIFIED, PAIN PRESENT: ICD-10-PCS | Mod: CPTII,S$GLB,, | Performed by: ANESTHESIOLOGY

## 2021-09-16 PROCEDURE — 3288F FALL RISK ASSESSMENT DOCD: CPT | Mod: CPTII,S$GLB,, | Performed by: ANESTHESIOLOGY

## 2021-09-16 PROCEDURE — 3008F BODY MASS INDEX DOCD: CPT | Mod: CPTII,S$GLB,, | Performed by: ANESTHESIOLOGY

## 2021-09-16 PROCEDURE — 99999 PR PBB SHADOW E&M-EST. PATIENT-LVL III: ICD-10-PCS | Mod: PBBFAC,,, | Performed by: ANESTHESIOLOGY

## 2021-09-16 PROCEDURE — 1160F RVW MEDS BY RX/DR IN RCRD: CPT | Mod: CPTII,S$GLB,, | Performed by: ANESTHESIOLOGY

## 2021-09-16 PROCEDURE — 3288F PR FALLS RISK ASSESSMENT DOCUMENTED: ICD-10-PCS | Mod: CPTII,S$GLB,, | Performed by: ANESTHESIOLOGY

## 2021-09-16 RX ORDER — PREGABALIN 50 MG/1
50 CAPSULE ORAL 2 TIMES DAILY
Qty: 60 CAPSULE | Refills: 0 | Status: SHIPPED | OUTPATIENT
Start: 2021-09-16 | End: 2022-09-29

## 2021-09-16 RX ORDER — PREGABALIN 50 MG/1
50 CAPSULE ORAL 2 TIMES DAILY
Qty: 60 CAPSULE | Refills: 0 | Status: SHIPPED | OUTPATIENT
Start: 2021-09-16 | End: 2021-09-16 | Stop reason: SDUPTHER

## 2021-09-21 ENCOUNTER — TELEPHONE (OUTPATIENT)
Dept: PAIN MEDICINE | Facility: CLINIC | Age: 71
End: 2021-09-21

## 2021-09-23 ENCOUNTER — TELEPHONE (OUTPATIENT)
Dept: PAIN MEDICINE | Facility: CLINIC | Age: 71
End: 2021-09-23

## 2021-10-05 ENCOUNTER — TELEPHONE (OUTPATIENT)
Dept: PAIN MEDICINE | Facility: CLINIC | Age: 71
End: 2021-10-05

## 2021-11-10 ENCOUNTER — TELEPHONE (OUTPATIENT)
Dept: PAIN MEDICINE | Facility: OTHER | Age: 71
End: 2021-11-10
Payer: COMMERCIAL

## 2021-11-10 ENCOUNTER — PATIENT MESSAGE (OUTPATIENT)
Dept: PAIN MEDICINE | Facility: CLINIC | Age: 71
End: 2021-11-10
Payer: COMMERCIAL

## 2021-11-10 ENCOUNTER — TELEPHONE (OUTPATIENT)
Dept: PAIN MEDICINE | Facility: CLINIC | Age: 71
End: 2021-11-10
Payer: COMMERCIAL

## 2021-12-10 ENCOUNTER — TELEPHONE (OUTPATIENT)
Dept: PAIN MEDICINE | Facility: CLINIC | Age: 71
End: 2021-12-10
Payer: COMMERCIAL

## 2021-12-30 ENCOUNTER — TELEPHONE (OUTPATIENT)
Dept: PAIN MEDICINE | Facility: CLINIC | Age: 71
End: 2021-12-30
Payer: COMMERCIAL

## 2022-06-04 ENCOUNTER — HOSPITAL ENCOUNTER (EMERGENCY)
Facility: HOSPITAL | Age: 72
Discharge: HOME | End: 2022-06-04
Attending: EMERGENCY MEDICINE
Payer: COMMERCIAL

## 2022-06-04 VITALS
SYSTOLIC BLOOD PRESSURE: 130 MMHG | WEIGHT: 180 LBS | OXYGEN SATURATION: 96 % | RESPIRATION RATE: 20 BRPM | HEART RATE: 62 BPM | DIASTOLIC BLOOD PRESSURE: 65 MMHG | HEIGHT: 75 IN | TEMPERATURE: 99 F | BODY MASS INDEX: 22.38 KG/M2

## 2022-06-04 DIAGNOSIS — R07.89 CHEST PAIN, NON-CARDIAC: ICD-10-CM

## 2022-06-04 PROCEDURE — 99284 EMERGENCY DEPT VISIT MOD MDM: CPT | Mod: 25

## 2022-06-04 NOTE — DISCHARGE INSTRUCTIONS
Please follow-up with Dr. Martinez, the cardiologist above next week.  Return immediately if you get worse or if new problems develop.  Tylenol or ibuprofen for pain.  Rest.  You may use a heating pad.

## 2022-06-04 NOTE — ED NOTES
"Pt ambulatory to ED for intermittent left chest wall pain starting yesterday.  Pt describes pain as "feels like it's a muscle strain".  PMHx of SVT, pt states this does not feel like previous episode of SVT. HR NSR on tele. Pt denies SOB, denies HA, denies dizziness.  Pt VSS, A/Ox4, skin warm/dry, afebrile. Bed low/locked, siderails upx2, pt agrees to plan of care.  "

## 2022-06-04 NOTE — ED PROVIDER NOTES
Encounter Date: 6/4/2022       History     Chief Complaint   Patient presents with    Chest Pain     Patient reports left Cw pain that started about and hour ago, states H/O of SVT and stenosis. EKG completed in Triage     HPI   This 71-year-old white male presents to the emergency room complaining of left-sided chest pain started about an hour ago.  It is a sharp stabbing pain that comes and goes in episodes of last seconds.  It remains extremely confined to a 2.5 cm area in the midclavicular line at about the 4th rib.  Pain is not noted to be sharper worse with deep breathing moving twisting or turning but seems to come and go on its own in episodes that last a 2nd or 2. The patient denies shortness of breath cough fever.  There is no nausea or sweating.    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    Empty sella     Family history of ischemic heart disease 1/14/2014    Hypertension     Hypogonadism male 2004    Hypothyroidism 1/14/2014    Diagnosed 2003.    Nonspecific abnormal results of endocrine function study 6/20/2014    Statin intolerance     SVT (supraventricular tachycardia)      Past Surgical History:   Procedure Laterality Date    HAIR TRANSPLANT      TONSILLECTOMY, ADENOIDECTOMY  1957     Family History   Problem Relation Age of Onset    Cancer Mother 58        breast mets to lung    Heart disease Father     Cancer Sister     Cancer Brother         hodgkins lymphoma    Diabetes Son         type 1    Cancer Brother         bladder     Social History     Tobacco Use    Smoking status: Never Smoker    Smokeless tobacco: Never Used   Substance Use Topics    Alcohol use: Yes     Alcohol/week: 6.0  standard drinks     Types: 4 Glasses of wine, 2 Shots of liquor per week     Comment: Socially  3-4x week    Drug use: No     Review of Systems  The patient was questioned specifically with regard to the following.  General: Fever, chills, sweats. Neuro: Headache. Eyes: eye problems. ENT: Ear pain, sore throat. Cardiovascular: Chest pain. Respiratory: Cough, shortness of breath. Gastrointestinal: Abdominal pain, vomiting, diarrhea. Genitourinary: Painful urination.  Musculoskeletal: Arm and leg problems. Skin: Rash.  The review of systems was negative except for the following:  Chest pain  Physical Exam     Initial Vitals [06/04/22 1157]   BP Pulse Resp Temp SpO2   128/63 64 18 98.7 °F (37.1 °C) 95 %      MAP       --         Physical Exam  The patient was examined specifically for the following:   General:No significant distress, Good color, Warm and dry. Head and neck:Scalp atraumatic, Neck supple. Neurological:Appropriate conversation, Gross motor deficits. Eyes:Conjugate gaze, Clear corneas. ENT: No epistaxis. Cardiac: Regular rate and rhythm, Grossly normal heart tones. Pulmonary: Wheezing, Rales. Gastrointestinal: Abdominal tenderness, Abdominal distention. Musculoskeletal: Extremity deformity, Apparent pain with range of motion of the joints. Skin: Rash.   The findings on examination were normal except for the following:  Patient has point tenderness in the left pectoral chest in the mid clavicular line.  The lungs are clear and free of wheezing rales rubs or rhonchi.  Heart tones are normal.  The patient has regular rate and rhythm.  The abdomen is nontender.  There is no guarding rebound mass or distention.  Extremities are nontender there is no pain with range of motion of any joints.  ED Course   Procedures  Labs Reviewed - No data to display  EKG Readings: (Independently Interpreted)   This patient is in a normal sinus rhythm with a first-degree AV block.  There are no significant ST segment or T-wave  changes.  There is no evidence of acute myocardial infarction or malignant arrhythmia.       Imaging Results          X-Ray Chest 1 View (Final result)  Result time 22 12:43:34    Final result by Billy Rodríguez MD (22 12:43:34)                 Impression:      No convincing evidence of acute cardiopulmonary disease.      Electronically signed by: Billy Rodríguez  Date:    2022  Time:    12:43             Narrative:    EXAMINATION:  XR CHEST 1 VIEW    CLINICAL HISTORY:  Other chest pain    TECHNIQUE:  Single frontal view of the chest was performed.    COMPARISON:  Chest radiograph performed 2018    FINDINGS:  Monitoring leads are noted.  Grossly unchanged cardiomediastinal contours.  Lungs appear essentially clear.  No definite pneumothorax or large volume pleural effusion.  No acute findings identified in the visualized abdomen.  Osseous and soft tissue structures appear without definite acute change.                              Medical decision makin given the above this to be a noncardiac syndrome.  I doubt pulmonary embolus.  The patient is not short of breath or tachycardic there is no swelling or tenderness of the legs there is no cough or hemoptysis.  Chest x-ray fails to reveal significant abnormalities.  I will discharge to outpatient evaluation and symptomatic treatment.  The patient declines any specific treatment from me.        Medications - No data to display                       Clinical Impression:   Final diagnoses:  [R07.89] Chest pain, non-cardiac          ED Disposition Condition    Discharge Stable        ED Prescriptions     None        Follow-up Information     Follow up With Specialties Details Why Contact Info    Fernie Villar MD Cardiovascular Disease, Interventional Cardiology, Cardiology In 1 week  [address]  [address]  [facility] [address]  [phone]             Zhou Johnson MD  22 1300       Zhou Johnson MD  [phone]

## 2022-09-29 ENCOUNTER — OFFICE VISIT (OUTPATIENT)
Dept: INTERNAL MEDICINE | Facility: CLINIC | Age: 72
End: 2022-09-29
Payer: COMMERCIAL

## 2022-09-29 VITALS
BODY MASS INDEX: 23.84 KG/M2 | TEMPERATURE: 98 F | DIASTOLIC BLOOD PRESSURE: 76 MMHG | SYSTOLIC BLOOD PRESSURE: 134 MMHG | HEIGHT: 71 IN | HEART RATE: 67 BPM | WEIGHT: 170.31 LBS

## 2022-09-29 DIAGNOSIS — E34.9 HYPOTESTOSTERONEMIA: ICD-10-CM

## 2022-09-29 DIAGNOSIS — F51.09 SITUATIONAL INSOMNIA: ICD-10-CM

## 2022-09-29 DIAGNOSIS — I35.0 AORTIC STENOSIS, MODERATE: ICD-10-CM

## 2022-09-29 DIAGNOSIS — F41.1 GAD (GENERALIZED ANXIETY DISORDER): Primary | ICD-10-CM

## 2022-09-29 DIAGNOSIS — I71.20 THORACIC AORTIC ANEURYSM WITHOUT RUPTURE: ICD-10-CM

## 2022-09-29 DIAGNOSIS — E03.9 ACQUIRED HYPOTHYROIDISM: ICD-10-CM

## 2022-09-29 DIAGNOSIS — N40.1 BPH WITH URINARY OBSTRUCTION: ICD-10-CM

## 2022-09-29 DIAGNOSIS — E78.2 MIXED DYSLIPIDEMIA: ICD-10-CM

## 2022-09-29 DIAGNOSIS — N13.8 BPH WITH URINARY OBSTRUCTION: ICD-10-CM

## 2022-09-29 LAB
BILIRUB UR QL STRIP: NEGATIVE
CLARITY UR REFRACT.AUTO: CLEAR
COLOR UR AUTO: YELLOW
GLUCOSE UR QL STRIP: NEGATIVE
HGB UR QL STRIP: NEGATIVE
KETONES UR QL STRIP: ABNORMAL
LEUKOCYTE ESTERASE UR QL STRIP: NEGATIVE
NITRITE UR QL STRIP: NEGATIVE
PH UR STRIP: 6 [PH] (ref 5–8)
PROT UR QL STRIP: NEGATIVE
SP GR UR STRIP: 1.01 (ref 1–1.03)
URN SPEC COLLECT METH UR: ABNORMAL

## 2022-09-29 PROCEDURE — 3075F PR MOST RECENT SYSTOLIC BLOOD PRESS GE 130-139MM HG: ICD-10-PCS | Mod: CPTII,S$GLB,, | Performed by: INTERNAL MEDICINE

## 2022-09-29 PROCEDURE — 1160F PR REVIEW ALL MEDS BY PRESCRIBER/CLIN PHARMACIST DOCUMENTED: ICD-10-PCS | Mod: CPTII,S$GLB,, | Performed by: INTERNAL MEDICINE

## 2022-09-29 PROCEDURE — 3008F PR BODY MASS INDEX (BMI) DOCUMENTED: ICD-10-PCS | Mod: CPTII,S$GLB,, | Performed by: INTERNAL MEDICINE

## 2022-09-29 PROCEDURE — 4010F ACE/ARB THERAPY RXD/TAKEN: CPT | Mod: CPTII,S$GLB,, | Performed by: INTERNAL MEDICINE

## 2022-09-29 PROCEDURE — 1101F PT FALLS ASSESS-DOCD LE1/YR: CPT | Mod: CPTII,S$GLB,, | Performed by: INTERNAL MEDICINE

## 2022-09-29 PROCEDURE — 99204 PR OFFICE/OUTPT VISIT, NEW, LEVL IV, 45-59 MIN: ICD-10-PCS | Mod: S$GLB,,, | Performed by: INTERNAL MEDICINE

## 2022-09-29 PROCEDURE — 81003 URINALYSIS AUTO W/O SCOPE: CPT | Performed by: INTERNAL MEDICINE

## 2022-09-29 PROCEDURE — 1126F PR PAIN SEVERITY QUANTIFIED, NO PAIN PRESENT: ICD-10-PCS | Mod: CPTII,S$GLB,, | Performed by: INTERNAL MEDICINE

## 2022-09-29 PROCEDURE — 3288F PR FALLS RISK ASSESSMENT DOCUMENTED: ICD-10-PCS | Mod: CPTII,S$GLB,, | Performed by: INTERNAL MEDICINE

## 2022-09-29 PROCEDURE — 3008F BODY MASS INDEX DOCD: CPT | Mod: CPTII,S$GLB,, | Performed by: INTERNAL MEDICINE

## 2022-09-29 PROCEDURE — 99999 PR PBB SHADOW E&M-EST. PATIENT-LVL III: ICD-10-PCS | Mod: PBBFAC,,, | Performed by: INTERNAL MEDICINE

## 2022-09-29 PROCEDURE — 99204 OFFICE O/P NEW MOD 45 MIN: CPT | Mod: S$GLB,,, | Performed by: INTERNAL MEDICINE

## 2022-09-29 PROCEDURE — 1160F RVW MEDS BY RX/DR IN RCRD: CPT | Mod: CPTII,S$GLB,, | Performed by: INTERNAL MEDICINE

## 2022-09-29 PROCEDURE — 3075F SYST BP GE 130 - 139MM HG: CPT | Mod: CPTII,S$GLB,, | Performed by: INTERNAL MEDICINE

## 2022-09-29 PROCEDURE — 3078F DIAST BP <80 MM HG: CPT | Mod: CPTII,S$GLB,, | Performed by: INTERNAL MEDICINE

## 2022-09-29 PROCEDURE — 1159F PR MEDICATION LIST DOCUMENTED IN MEDICAL RECORD: ICD-10-PCS | Mod: CPTII,S$GLB,, | Performed by: INTERNAL MEDICINE

## 2022-09-29 PROCEDURE — 1101F PR PT FALLS ASSESS DOC 0-1 FALLS W/OUT INJ PAST YR: ICD-10-PCS | Mod: CPTII,S$GLB,, | Performed by: INTERNAL MEDICINE

## 2022-09-29 PROCEDURE — 1159F MED LIST DOCD IN RCRD: CPT | Mod: CPTII,S$GLB,, | Performed by: INTERNAL MEDICINE

## 2022-09-29 PROCEDURE — 1126F AMNT PAIN NOTED NONE PRSNT: CPT | Mod: CPTII,S$GLB,, | Performed by: INTERNAL MEDICINE

## 2022-09-29 PROCEDURE — 4010F PR ACE/ARB THEARPY RXD/TAKEN: ICD-10-PCS | Mod: CPTII,S$GLB,, | Performed by: INTERNAL MEDICINE

## 2022-09-29 PROCEDURE — 3288F FALL RISK ASSESSMENT DOCD: CPT | Mod: CPTII,S$GLB,, | Performed by: INTERNAL MEDICINE

## 2022-09-29 PROCEDURE — 99999 PR PBB SHADOW E&M-EST. PATIENT-LVL III: CPT | Mod: PBBFAC,,, | Performed by: INTERNAL MEDICINE

## 2022-09-29 PROCEDURE — 3078F PR MOST RECENT DIASTOLIC BLOOD PRESSURE < 80 MM HG: ICD-10-PCS | Mod: CPTII,S$GLB,, | Performed by: INTERNAL MEDICINE

## 2022-09-29 RX ORDER — TRIAMCINOLONE ACETONIDE 1 MG/G
CREAM TOPICAL 2 TIMES DAILY
COMMUNITY
Start: 2022-05-16 | End: 2022-10-31

## 2022-09-29 RX ORDER — LEVOTHYROXINE SODIUM 150 MCG
150 TABLET ORAL EVERY MORNING
COMMUNITY
Start: 2022-08-22 | End: 2022-10-13

## 2022-09-29 RX ORDER — ANASTROZOLE 1 MG/1
0.5 TABLET ORAL
COMMUNITY
Start: 2022-08-29 | End: 2022-09-29

## 2022-09-29 RX ORDER — TAMSULOSIN HYDROCHLORIDE 0.4 MG/1
0.4 CAPSULE ORAL DAILY
Qty: 90 CAPSULE | Refills: 1 | Status: SHIPPED | OUTPATIENT
Start: 2022-09-29 | End: 2022-10-21

## 2022-09-29 RX ORDER — TRAZODONE HYDROCHLORIDE 100 MG/1
100 TABLET ORAL NIGHTLY
Qty: 30 TABLET | Refills: 1 | Status: SHIPPED | OUTPATIENT
Start: 2022-09-29 | End: 2022-11-07 | Stop reason: SDUPTHER

## 2022-09-29 RX ORDER — COLCHICINE 0.6 MG/1
0.6 TABLET ORAL 2 TIMES DAILY
COMMUNITY
Start: 2022-09-24 | End: 2022-10-21

## 2022-09-29 RX ORDER — TAMSULOSIN HYDROCHLORIDE 0.4 MG/1
CAPSULE ORAL
Qty: 90 CAPSULE | Refills: 1 | Status: SHIPPED | OUTPATIENT
Start: 2022-09-29 | End: 2022-09-29 | Stop reason: SDUPTHER

## 2022-09-29 RX ORDER — DUTASTERIDE 0.5 MG/1
0.5 CAPSULE, LIQUID FILLED ORAL DAILY
COMMUNITY
Start: 2022-09-15 | End: 2022-10-31

## 2022-09-29 RX ORDER — ESZOPICLONE 2 MG/1
2 TABLET, FILM COATED ORAL NIGHTLY
Qty: 30 TABLET | Refills: 1 | Status: SHIPPED | OUTPATIENT
Start: 2022-09-29 | End: 2022-10-13

## 2022-09-29 RX ORDER — TAMSULOSIN HYDROCHLORIDE 0.4 MG/1
CAPSULE ORAL
COMMUNITY
End: 2022-09-29

## 2022-09-29 RX ORDER — PROPRANOLOL HYDROCHLORIDE 20 MG/1
20 TABLET ORAL 2 TIMES DAILY PRN
Qty: 60 TABLET | Refills: 0 | Status: SHIPPED | OUTPATIENT
Start: 2022-09-29 | End: 2022-10-31

## 2022-09-29 RX ORDER — DIAZEPAM 5 MG/1
2.5 TABLET ORAL NIGHTLY
COMMUNITY
Start: 2022-09-12 | End: 2022-10-13

## 2022-09-29 RX ORDER — METFORMIN HYDROCHLORIDE 500 MG/1
500 TABLET, FILM COATED, EXTENDED RELEASE ORAL
COMMUNITY
End: 2022-10-21

## 2022-09-29 RX ORDER — TRAZODONE HYDROCHLORIDE 50 MG/1
50 TABLET ORAL NIGHTLY PRN
COMMUNITY
Start: 2022-09-16 | End: 2022-09-29

## 2022-09-29 RX ORDER — DOXYCYCLINE HYCLATE 100 MG
100 TABLET ORAL 2 TIMES DAILY
COMMUNITY
Start: 2022-09-10 | End: 2022-10-13

## 2022-10-02 PROBLEM — I71.21 ASCENDING AORTIC ANEURYSM: Status: ACTIVE | Noted: 2019-06-20

## 2022-10-02 PROBLEM — I35.0 NONRHEUMATIC AORTIC VALVE STENOSIS: Status: ACTIVE | Noted: 2019-06-20

## 2022-10-02 NOTE — PROGRESS NOTES
Subjective:       Patient ID: Fernie Maldonado Dr. is a 72 y.o. male.    Chief Complaint: Establish Care    72-year-old very pleasant nonsmoking gentleman here to establish with [facility].  Does have an extensive past medical history an aortic thoracic aneurysm 5 cm which appears to have been discovered incidentally back in 2019 with moderate aortic stenosis.  He also has a history dyslipidemia and likely intolerance to statins as he is on Repatha.  Acquired hypothyroidism dating back into his 40s some likely autoimmune and hypo testosterone with BPH and most recently a mild elevation of PSA.  He has a urologist Dr. Pacheco and a cardiologist Dr. Martinez for many years and also now follows with endocrinology, Dr Polo.  His armor thyroid was changed to few months ago to levothyroxine by Dr. Polo.  He also does lab work through [facility].  Last lab work still showed a mild elevation in TSH at 5 and the thyroid was increased from 100-150.  He also takes testosterone 100 mg every 5 days intramuscularly.  His major complaint however is of an adequate sleep both sleep onset and sleep maintenance as well as anxiety with panic.  He has been using a low dose of trazodone and has had a prescription initially for lorazepam and now diazepam.  He would like some guidance for sleep and anxiety with medication recommendations and a more holistic approach to both his insomnia and anxiety.    Review of Systems   Constitutional:  Negative for activity change, diaphoresis, fatigue and fever.   HENT:  Negative for nasal congestion, ear pain, postnasal drip, sinus pressure/congestion, sore throat and trouble swallowing.    Eyes:  Negative for pain.   Respiratory:  Negative for cough, chest tightness, shortness of breath and wheezing.    Cardiovascular:  Negative for chest pain, palpitations and leg swelling.   Gastrointestinal:  Negative for abdominal distention, abdominal pain, blood in stool, constipation and diarrhea.    Endocrine: Negative for cold intolerance and heat intolerance.   Genitourinary:  Negative for decreased urine volume, difficulty urinating, dysuria, flank pain, frequency and hematuria.   Musculoskeletal:  Negative for arthralgias, back pain, joint swelling, myalgias and neck pain.   Integumentary:  Negative for pallor, rash and wound.   Neurological:  Negative for tremors, syncope, weakness, light-headedness, numbness and headaches.   Hematological:  Negative for adenopathy.   Psychiatric/Behavioral:  Positive for sleep disturbance. Negative for confusion, decreased concentration, hallucinations, self-injury and suicidal ideas. The patient is nervous/anxious.        Objective:      Physical Exam  Constitutional:       General: He is not in acute distress.     Appearance: Normal appearance.   HENT:      Head: Normocephalic and atraumatic.   Eyes:      Conjunctiva/sclera: Conjunctivae normal.   Cardiovascular:      Rate and Rhythm: Normal rate and regular rhythm.      Heart sounds: Murmur heard.   Systolic murmur is present with a grade of 2/6.      Comments: Systolic ejection murmer radiates to the carotids   Pulmonary:      Effort: Pulmonary effort is normal.      Breath sounds: Normal breath sounds.   Abdominal:      Palpations: Abdomen is soft.   Musculoskeletal:         General: Normal range of motion.      Cervical back: Normal range of motion and neck supple.      Right lower leg: No edema.      Left lower leg: No edema.   Skin:     General: Skin is warm and dry.   Neurological:      General: No focal deficit present.      Mental Status: He is alert.   Psychiatric:         Mood and Affect: Mood normal.         Behavior: Behavior normal.         Thought Content: Thought content normal.         Judgment: Judgment normal.       Assessment/plan          DILMA (generalized anxiety disorder)  -     traZODone (DESYREL) 100 MG tablet; Take 1 tablet (100 mg total) by mouth every evening.  Dispense: 30 tablet; Refill:  1  -     propranoloL (INDERAL) 20 MG tablet; Take 1 tablet (20 mg total) by mouth 2 (two) times daily as needed (panic or anxiousness).  Dispense: 60 tablet; Refill: 0    Hypotestosteronemia  Comments:  dose at present is 100 mg IM every 5 days which is above the standard dose of 400 mg monthly   will recheck in a few weeks . Both hyper testosterone  and hyperthyroidism can contribute to increased anxiety as well as lack of restorative sleep   Orders:  -     Testosterone Panel; Future; 10/10/2022    Situational insomnia  -     eszopiclone (LUNESTA) 2 MG Tab; Take 1 tablet (2 mg total) by mouth nightly.  Dispense: 30 tablet; Refill: 1  PLUS TRAZODONE above and ok for melatonin as well.     Acquired hypothyroidism  Comments:  will recheck TFTS before next visit as it has only been a few weeks since levothyroxine increased from 100 mcg to 150 mcg daily   pt maybe hyperthyroid and resp  Orders:  -     TSH; Future; Expected date: 10/10/2022  -     T4, Free; Future; Expected date: 10/10/2022    Aortic stenosis, moderate  Comments:  will need to get recent ECHO and test results from Dr Martinez  control blood pressure tightly - continue with telmisartan   Orders:  -     B-TYPE NATRIURETIC PEPTIDE; Future; Expected date: 10/10/2022    Mixed dyslipidemia- LDL on recent lab work 117 and total chol/good is 3.0  Comments:  continue with Repatha     Thoracic aortic aneurysm without rupture- 5cm   Comments:  will need to get recent ECHO and test results from Dr Martinez  control blood pressure and cholesterol tightly     BPH with urinary obstruction  Comments:  continue with dutasteride and tamsulosin   follows with dr Pacheco

## 2022-10-03 ENCOUNTER — PATIENT MESSAGE (OUTPATIENT)
Dept: ADMINISTRATIVE | Facility: HOSPITAL | Age: 72
End: 2022-10-03
Payer: COMMERCIAL

## 2022-10-08 LAB
BNP SERPL-MCNC: 61.5 PG/ML (ref 0–100)
T4 FREE SERPL-MCNC: 2.07 NG/DL (ref 0.82–1.77)
TESTOST SERPL-MCNC: 461 NG/DL (ref 264–916)
TSH SERPL DL<=0.005 MIU/L-ACNC: 3.85 UIU/ML (ref 0.45–4.5)

## 2022-10-13 ENCOUNTER — CLINICAL SUPPORT (OUTPATIENT)
Dept: INTERNAL MEDICINE | Facility: CLINIC | Age: 72
End: 2022-10-13
Payer: COMMERCIAL

## 2022-10-13 ENCOUNTER — OFFICE VISIT (OUTPATIENT)
Dept: INTERNAL MEDICINE | Facility: CLINIC | Age: 72
End: 2022-10-13
Payer: COMMERCIAL

## 2022-10-13 ENCOUNTER — HOSPITAL ENCOUNTER (OUTPATIENT)
Dept: RADIOLOGY | Facility: HOSPITAL | Age: 72
Discharge: HOME | End: 2022-10-13
Attending: INTERNAL MEDICINE
Payer: COMMERCIAL

## 2022-10-13 VITALS
TEMPERATURE: 98 F | DIASTOLIC BLOOD PRESSURE: 77 MMHG | HEART RATE: 63 BPM | HEIGHT: 71 IN | SYSTOLIC BLOOD PRESSURE: 145 MMHG | WEIGHT: 161.38 LBS | BODY MASS INDEX: 22.59 KG/M2

## 2022-10-13 DIAGNOSIS — I35.0 NONRHEUMATIC AORTIC VALVE STENOSIS: Primary | ICD-10-CM

## 2022-10-13 DIAGNOSIS — E04.1 THYROID NODULE: ICD-10-CM

## 2022-10-13 DIAGNOSIS — E03.9 ACQUIRED HYPOTHYROIDISM: Primary | ICD-10-CM

## 2022-10-13 DIAGNOSIS — E34.9 HYPOTESTOSTERONEMIA: ICD-10-CM

## 2022-10-13 DIAGNOSIS — E05.90 HYPERTHYROIDISM: ICD-10-CM

## 2022-10-13 DIAGNOSIS — E05.80 IATROGENIC HYPERTHYROIDISM: ICD-10-CM

## 2022-10-13 DIAGNOSIS — E03.9 ACQUIRED HYPOTHYROIDISM: ICD-10-CM

## 2022-10-13 DIAGNOSIS — F51.09 SITUATIONAL INSOMNIA: ICD-10-CM

## 2022-10-13 DIAGNOSIS — F41.1 GAD (GENERALIZED ANXIETY DISORDER): ICD-10-CM

## 2022-10-13 LAB
T3 SERPL-MCNC: 48 NG/DL (ref 60–180)
T4 FREE SERPL-MCNC: 1.03 NG/DL (ref 0.71–1.51)
TSH SERPL DL<=0.005 MIU/L-ACNC: 3.44 UIU/ML (ref 0.4–4)

## 2022-10-13 PROCEDURE — 99214 PR OFFICE/OUTPT VISIT, EST, LEVL IV, 30-39 MIN: ICD-10-PCS | Mod: S$GLB,,, | Performed by: INTERNAL MEDICINE

## 2022-10-13 PROCEDURE — 1125F PR PAIN SEVERITY QUANTIFIED, PAIN PRESENT: ICD-10-PCS | Mod: CPTII,S$GLB,, | Performed by: INTERNAL MEDICINE

## 2022-10-13 PROCEDURE — 1159F PR MEDICATION LIST DOCUMENTED IN MEDICAL RECORD: ICD-10-PCS | Mod: CPTII,S$GLB,, | Performed by: INTERNAL MEDICINE

## 2022-10-13 PROCEDURE — 3078F DIAST BP <80 MM HG: CPT | Mod: CPTII,S$GLB,, | Performed by: INTERNAL MEDICINE

## 2022-10-13 PROCEDURE — 1160F PR REVIEW ALL MEDS BY PRESCRIBER/CLIN PHARMACIST DOCUMENTED: ICD-10-PCS | Mod: CPTII,S$GLB,, | Performed by: INTERNAL MEDICINE

## 2022-10-13 PROCEDURE — 1125F AMNT PAIN NOTED PAIN PRSNT: CPT | Mod: CPTII,S$GLB,, | Performed by: INTERNAL MEDICINE

## 2022-10-13 PROCEDURE — 99214 OFFICE O/P EST MOD 30 MIN: CPT | Mod: S$GLB,,, | Performed by: INTERNAL MEDICINE

## 2022-10-13 PROCEDURE — 76536 US EXAM OF HEAD AND NECK: CPT | Mod: 26,,, | Performed by: STUDENT IN AN ORGANIZED HEALTH CARE EDUCATION/TRAINING PROGRAM

## 2022-10-13 PROCEDURE — 4010F ACE/ARB THERAPY RXD/TAKEN: CPT | Mod: CPTII,S$GLB,, | Performed by: INTERNAL MEDICINE

## 2022-10-13 PROCEDURE — 3078F PR MOST RECENT DIASTOLIC BLOOD PRESSURE < 80 MM HG: ICD-10-PCS | Mod: CPTII,S$GLB,, | Performed by: INTERNAL MEDICINE

## 2022-10-13 PROCEDURE — 76536 US SOFT TISSUE HEAD NECK THYROID: ICD-10-PCS | Mod: 26,,, | Performed by: STUDENT IN AN ORGANIZED HEALTH CARE EDUCATION/TRAINING PROGRAM

## 2022-10-13 PROCEDURE — 84443 ASSAY THYROID STIM HORMONE: CPT | Performed by: INTERNAL MEDICINE

## 2022-10-13 PROCEDURE — 1160F RVW MEDS BY RX/DR IN RCRD: CPT | Mod: CPTII,S$GLB,, | Performed by: INTERNAL MEDICINE

## 2022-10-13 PROCEDURE — 3077F SYST BP >= 140 MM HG: CPT | Mod: CPTII,S$GLB,, | Performed by: INTERNAL MEDICINE

## 2022-10-13 PROCEDURE — 76536 US EXAM OF HEAD AND NECK: CPT | Mod: TC

## 2022-10-13 PROCEDURE — 1159F MED LIST DOCD IN RCRD: CPT | Mod: CPTII,S$GLB,, | Performed by: INTERNAL MEDICINE

## 2022-10-13 PROCEDURE — 99999 PR PBB SHADOW E&M-EST. PATIENT-LVL III: CPT | Mod: PBBFAC,,, | Performed by: INTERNAL MEDICINE

## 2022-10-13 PROCEDURE — 4010F PR ACE/ARB THEARPY RXD/TAKEN: ICD-10-PCS | Mod: CPTII,S$GLB,, | Performed by: INTERNAL MEDICINE

## 2022-10-13 PROCEDURE — 84479 ASSAY OF THYROID (T3 OR T4): CPT | Performed by: INTERNAL MEDICINE

## 2022-10-13 PROCEDURE — 84439 ASSAY OF FREE THYROXINE: CPT | Performed by: INTERNAL MEDICINE

## 2022-10-13 PROCEDURE — 99999 PR PBB SHADOW E&M-EST. PATIENT-LVL III: ICD-10-PCS | Mod: PBBFAC,,, | Performed by: INTERNAL MEDICINE

## 2022-10-13 PROCEDURE — 3077F PR MOST RECENT SYSTOLIC BLOOD PRESSURE >= 140 MM HG: ICD-10-PCS | Mod: CPTII,S$GLB,, | Performed by: INTERNAL MEDICINE

## 2022-10-13 PROCEDURE — 84480 ASSAY TRIIODOTHYRONINE (T3): CPT | Performed by: INTERNAL MEDICINE

## 2022-10-13 RX ORDER — LEVOTHYROXINE SODIUM 75 UG/1
75 TABLET ORAL
Qty: 30 TABLET | Refills: 1 | Status: SHIPPED | OUTPATIENT
Start: 2022-10-13 | End: 2022-11-04

## 2022-10-13 RX ORDER — TESTOSTERONE CYPIONATE 1000 MG/10ML
100 INJECTION, SOLUTION INTRAMUSCULAR WEEKLY
Qty: 10 ML | Refills: 0 | Status: SHIPPED | OUTPATIENT
Start: 2022-10-13 | End: 2022-10-13 | Stop reason: SDUPTHER

## 2022-10-13 RX ORDER — ALPRAZOLAM 1 MG/1
1 TABLET ORAL NIGHTLY PRN
Qty: 30 TABLET | Refills: 1 | Status: SHIPPED | OUTPATIENT
Start: 2022-10-13 | End: 2022-10-31

## 2022-10-13 RX ORDER — SERTRALINE HYDROCHLORIDE 50 MG/1
50 TABLET, FILM COATED ORAL DAILY
Qty: 30 TABLET | Refills: 1 | Status: SHIPPED | OUTPATIENT
Start: 2022-10-13 | End: 2022-10-21

## 2022-10-13 RX ORDER — TESTOSTERONE CYPIONATE 1000 MG/10ML
100 INJECTION, SOLUTION INTRAMUSCULAR WEEKLY
Qty: 10 ML | Refills: 0 | Status: SHIPPED | OUTPATIENT
Start: 2022-10-13 | End: 2022-10-31

## 2022-10-13 RX ORDER — CYANOCOBALAMIN 1000 UG/ML
1000 INJECTION, SOLUTION INTRAMUSCULAR; SUBCUTANEOUS
Qty: 10 ML | Refills: 1 | Status: SHIPPED | OUTPATIENT
Start: 2022-10-13 | End: 2022-10-31

## 2022-10-15 LAB — T3RU NFR SERPL: 37 % (ref 28–41)

## 2022-10-16 NOTE — PROGRESS NOTES
Subjective:       Patient ID: Fernie Maldonado Dr. is a 72 y.o. male.    Chief Complaint: Follow-up  For DILMA and insomnia.      72-year-old very pleasant gentleman nonsmoker who is here for follow-up on insomnia and increasing anxiety which is very situational.  He also has a past medical history significant for hypothyroidism and recently back in the [place] was changed from [place] Thyroid to levothyroxine by endocrinologist.  A transition has not been easy for him.  He has held his 150 mg a levothyroxine which was only increased a few weeks prior.  As his free T4 was quite high.  He also has a past medical history significant for aortic stenosis with dyslipidemia on Repatha as well as an aortic aneurysm that is 5 cm.  Is followed closely by Cardiology.  He also has an elevation of PSA is followed by Urology.    Review of Systems   Constitutional:  Negative for activity change, diaphoresis, fatigue and fever.   HENT:  Negative for nasal congestion, ear pain, postnasal drip, sinus pressure/congestion, sore throat and trouble swallowing.    Eyes:  Negative for pain.   Respiratory:  Negative for cough, chest tightness, shortness of breath and wheezing.    Cardiovascular:  Negative for chest pain, palpitations and leg swelling.   Gastrointestinal:  Negative for abdominal distention, abdominal pain, blood in stool, constipation and diarrhea.   Endocrine: Negative for cold intolerance and heat intolerance.   Genitourinary:  Negative for decreased urine volume, difficulty urinating, dysuria, flank pain, frequency and hematuria.   Musculoskeletal:  Negative for arthralgias, back pain, joint swelling, myalgias and neck pain.   Integumentary:  Negative for pallor, rash and wound.   Neurological:  Negative for tremors, syncope, weakness, light-headedness, numbness and headaches.   Hematological:  Negative for adenopathy.   Psychiatric/Behavioral:  Positive for decreased concentration and sleep disturbance. Negative for  confusion, hallucinations, self-injury and suicidal ideas. The patient is nervous/anxious.        Objective:      Physical Exam  Constitutional:       General: He is not in acute distress.     Appearance: Normal appearance.   HENT:      Head: Normocephalic and atraumatic.   Eyes:      Conjunctiva/sclera: Conjunctivae normal.   Neck:      Thyroid: Thyromegaly present.   Cardiovascular:      Rate and Rhythm: Normal rate and regular rhythm.      Heart sounds: Murmur heard.   Crescendo decrescendo systolic murmur is present with a grade of 3/6.   Pulmonary:      Effort: Pulmonary effort is normal.      Breath sounds: Normal breath sounds.   Abdominal:      Palpations: Abdomen is soft.   Musculoskeletal:         General: Normal range of motion.      Cervical back: Normal range of motion and neck supple.   Skin:     General: Skin is warm and dry.   Neurological:      General: No focal deficit present.      Mental Status: He is alert.   Psychiatric:         Mood and Affect: Mood normal.         Behavior: Behavior normal.         Thought Content: Thought content normal.         Judgment: Judgment normal.       Assessment/plan       Problem List Items Addressed This Visit       Nonrheumatic severe aortic valve stenosis - Primary     Asymptomatic -  BMP checked was within normal limits.      Other Visit Diagnoses       Situational insomnia        Relevant Medications    ALPRAZolam (XANAX) 1 MG tablet plus continue with trazodone 100 mg nightly      DILMA (generalized anxiety disorder)        Relevant Medications    sertraline (ZOLOFT) 50 MG tablet    Iatrogenic hyperthyroidism        Relevant Medications    levothyroxine (SYNTHROID) 75 MCG tablet    Thyroid nodule        Relevant Orders    US Soft Tissue Head Neck Thyroid (Completed)    Hypotestosteronemia        Relevant Medications    testosterone cypionate (DEPOTESTOTERONE CYPIONATE) 100 mg/mL injection

## 2022-10-21 ENCOUNTER — CLINICAL SUPPORT (OUTPATIENT)
Dept: INTERNAL MEDICINE | Facility: CLINIC | Age: 72
End: 2022-10-21
Payer: COMMERCIAL

## 2022-10-21 ENCOUNTER — HOSPITAL ENCOUNTER (OUTPATIENT)
Dept: RADIOLOGY | Facility: HOSPITAL | Age: 72
Discharge: HOME | End: 2022-10-21
Attending: INTERNAL MEDICINE
Payer: COMMERCIAL

## 2022-10-21 ENCOUNTER — OFFICE VISIT (OUTPATIENT)
Dept: INTERNAL MEDICINE | Facility: CLINIC | Age: 72
End: 2022-10-21
Payer: COMMERCIAL

## 2022-10-21 VITALS
HEART RATE: 62 BPM | WEIGHT: 157.5 LBS | DIASTOLIC BLOOD PRESSURE: 73 MMHG | BODY MASS INDEX: 21.97 KG/M2 | SYSTOLIC BLOOD PRESSURE: 149 MMHG

## 2022-10-21 DIAGNOSIS — F48.8 NERVOUS BREAKDOWN: ICD-10-CM

## 2022-10-21 DIAGNOSIS — R63.4 ABNORMAL WEIGHT LOSS: Primary | ICD-10-CM

## 2022-10-21 DIAGNOSIS — Q23.0 AORTIC VALVE STENOSIS, CONGENITAL: Chronic | ICD-10-CM

## 2022-10-21 DIAGNOSIS — Z86.19 FREQUENT INFECTIONS: ICD-10-CM

## 2022-10-21 DIAGNOSIS — R41.82 ALTERED MENTAL STATUS, UNSPECIFIED ALTERED MENTAL STATUS TYPE: ICD-10-CM

## 2022-10-21 DIAGNOSIS — E03.9 ACQUIRED HYPOTHYROIDISM: ICD-10-CM

## 2022-10-21 DIAGNOSIS — E05.80 IATROGENIC HYPERTHYROIDISM: ICD-10-CM

## 2022-10-21 DIAGNOSIS — R35.0 URINE FREQUENCY: ICD-10-CM

## 2022-10-21 DIAGNOSIS — R10.84 GENERALIZED ABDOMINAL PAIN: ICD-10-CM

## 2022-10-21 DIAGNOSIS — F41.1 GAD (GENERALIZED ANXIETY DISORDER): ICD-10-CM

## 2022-10-21 DIAGNOSIS — R07.89 ATYPICAL CHEST PAIN: ICD-10-CM

## 2022-10-21 DIAGNOSIS — I71.21 ANEURYSM OF ASCENDING AORTA WITHOUT RUPTURE: Chronic | ICD-10-CM

## 2022-10-21 DIAGNOSIS — R25.1 TREMOR OF UNKNOWN ORIGIN: ICD-10-CM

## 2022-10-21 DIAGNOSIS — R26.81 UNSTABLE GAIT: ICD-10-CM

## 2022-10-21 DIAGNOSIS — F51.02 ADJUSTMENT INSOMNIA: ICD-10-CM

## 2022-10-21 DIAGNOSIS — N30.00 ACUTE CYSTITIS WITHOUT HEMATURIA: ICD-10-CM

## 2022-10-21 LAB
ALBUMIN SERPL BCP-MCNC: 3.8 G/DL (ref 3.5–5.2)
ALP SERPL-CCNC: 40 U/L (ref 55–135)
ALT SERPL W/O P-5'-P-CCNC: 27 U/L (ref 10–44)
ANION GAP SERPL CALC-SCNC: 6 MMOL/L (ref 8–16)
AST SERPL-CCNC: 27 U/L (ref 10–40)
BACTERIA #/AREA URNS AUTO: ABNORMAL /HPF
BASOPHILS # BLD AUTO: 0.03 K/UL (ref 0–0.2)
BASOPHILS NFR BLD: 0.3 % (ref 0–1.9)
BILIRUB SERPL-MCNC: 1 MG/DL (ref 0.1–1)
BILIRUB UR QL STRIP: NEGATIVE
BNP SERPL-MCNC: 70 PG/ML (ref 0–99)
BUN SERPL-MCNC: 29 MG/DL (ref 8–23)
CALCIUM SERPL-MCNC: 9.6 MG/DL (ref 8.7–10.5)
CHLORIDE SERPL-SCNC: 103 MMOL/L (ref 95–110)
CLARITY UR REFRACT.AUTO: ABNORMAL
CO2 SERPL-SCNC: 28 MMOL/L (ref 23–29)
COLOR UR AUTO: YELLOW
CREAT SERPL-MCNC: 1.1 MG/DL (ref 0.5–1.4)
CRP SERPL-MCNC: <0.3 MG/L (ref 0–8.2)
DIFFERENTIAL METHOD: ABNORMAL
EOSINOPHIL # BLD AUTO: 0 K/UL (ref 0–0.5)
EOSINOPHIL NFR BLD: 0.3 % (ref 0–8)
ERYTHROCYTE [DISTWIDTH] IN BLOOD BY AUTOMATED COUNT: 15 % (ref 11.5–14.5)
EST. GFR  (NO RACE VARIABLE): >60 ML/MIN/1.73 M^2
GLUCOSE SERPL-MCNC: 86 MG/DL (ref 70–110)
GLUCOSE UR QL STRIP: ABNORMAL
HCT VFR BLD AUTO: 44.1 % (ref 40–54)
HGB BLD-MCNC: 14.7 G/DL (ref 14–18)
HGB UR QL STRIP: NEGATIVE
IMM GRANULOCYTES # BLD AUTO: 0.09 K/UL (ref 0–0.04)
IMM GRANULOCYTES NFR BLD AUTO: 0.8 % (ref 0–0.5)
KETONES UR QL STRIP: ABNORMAL
LEUKOCYTE ESTERASE UR QL STRIP: ABNORMAL
LYMPHOCYTES # BLD AUTO: 0.7 K/UL (ref 1–4.8)
LYMPHOCYTES NFR BLD: 6.2 % (ref 18–48)
MCH RBC QN AUTO: 29.2 PG (ref 27–31)
MCHC RBC AUTO-ENTMCNC: 33.3 G/DL (ref 32–36)
MCV RBC AUTO: 88 FL (ref 82–98)
MICROSCOPIC COMMENT: ABNORMAL
MONOCYTES # BLD AUTO: 0.9 K/UL (ref 0.3–1)
MONOCYTES NFR BLD: 7.9 % (ref 4–15)
NEUTROPHILS # BLD AUTO: 10 K/UL (ref 1.8–7.7)
NEUTROPHILS NFR BLD: 84.5 % (ref 38–73)
NITRITE UR QL STRIP: NEGATIVE
NRBC BLD-RTO: 0 /100 WBC
PH UR STRIP: 6 [PH] (ref 5–8)
PLATELET # BLD AUTO: 254 K/UL (ref 150–450)
PMV BLD AUTO: 9.3 FL (ref 9.2–12.9)
POTASSIUM SERPL-SCNC: 4.3 MMOL/L (ref 3.5–5.1)
PROT SERPL-MCNC: 6.1 G/DL (ref 6–8.4)
PROT UR QL STRIP: ABNORMAL
RBC # BLD AUTO: 5.04 M/UL (ref 4.6–6.2)
RBC #/AREA URNS AUTO: 8 /HPF (ref 0–4)
SARS-COV-2 IGG SERPL IA-ACNC: NORMAL AU/ML
SARS-COV-2 IGG SERPL QL IA: POSITIVE
SODIUM SERPL-SCNC: 137 MMOL/L (ref 136–145)
SP GR UR STRIP: 1.02 (ref 1–1.03)
SQUAMOUS #/AREA URNS AUTO: 0 /HPF
T4 FREE SERPL-MCNC: 1.02 NG/DL (ref 0.71–1.51)
TROPONIN I SERPL DL<=0.01 NG/ML-MCNC: 0.02 NG/ML (ref 0–0.03)
TSH SERPL DL<=0.005 MIU/L-ACNC: 3.55 UIU/ML (ref 0.4–4)
URN SPEC COLLECT METH UR: ABNORMAL
WBC # BLD AUTO: 11.84 K/UL (ref 3.9–12.7)
WBC #/AREA URNS AUTO: >100 /HPF (ref 0–5)
WBC CLUMPS UR QL AUTO: ABNORMAL

## 2022-10-21 PROCEDURE — 1101F PR PT FALLS ASSESS DOC 0-1 FALLS W/OUT INJ PAST YR: ICD-10-PCS | Mod: CPTII,S$GLB,, | Performed by: INTERNAL MEDICINE

## 2022-10-21 PROCEDURE — 3078F PR MOST RECENT DIASTOLIC BLOOD PRESSURE < 80 MM HG: ICD-10-PCS | Mod: CPTII,S$GLB,, | Performed by: INTERNAL MEDICINE

## 2022-10-21 PROCEDURE — 87077 CULTURE AEROBIC IDENTIFY: CPT | Performed by: INTERNAL MEDICINE

## 2022-10-21 PROCEDURE — 85025 COMPLETE CBC W/AUTO DIFF WBC: CPT | Performed by: INTERNAL MEDICINE

## 2022-10-21 PROCEDURE — 99215 OFFICE O/P EST HI 40 MIN: CPT | Mod: S$GLB,,, | Performed by: INTERNAL MEDICINE

## 2022-10-21 PROCEDURE — 99215 PR OFFICE/OUTPT VISIT, EST, LEVL V, 40-54 MIN: ICD-10-PCS | Mod: S$GLB,,, | Performed by: INTERNAL MEDICINE

## 2022-10-21 PROCEDURE — 3077F PR MOST RECENT SYSTOLIC BLOOD PRESSURE >= 140 MM HG: ICD-10-PCS | Mod: CPTII,S$GLB,, | Performed by: INTERNAL MEDICINE

## 2022-10-21 PROCEDURE — 99999 PR PBB SHADOW E&M-EST. PATIENT-LVL III: CPT | Mod: PBBFAC,,, | Performed by: INTERNAL MEDICINE

## 2022-10-21 PROCEDURE — 86769 SARS-COV-2 COVID-19 ANTIBODY: CPT | Performed by: INTERNAL MEDICINE

## 2022-10-21 PROCEDURE — 3288F PR FALLS RISK ASSESSMENT DOCUMENTED: ICD-10-PCS | Mod: CPTII,S$GLB,, | Performed by: INTERNAL MEDICINE

## 2022-10-21 PROCEDURE — 3077F SYST BP >= 140 MM HG: CPT | Mod: CPTII,S$GLB,, | Performed by: INTERNAL MEDICINE

## 2022-10-21 PROCEDURE — 1125F AMNT PAIN NOTED PAIN PRSNT: CPT | Mod: CPTII,S$GLB,, | Performed by: INTERNAL MEDICINE

## 2022-10-21 PROCEDURE — 84439 ASSAY OF FREE THYROXINE: CPT | Performed by: INTERNAL MEDICINE

## 2022-10-21 PROCEDURE — 3078F DIAST BP <80 MM HG: CPT | Mod: CPTII,S$GLB,, | Performed by: INTERNAL MEDICINE

## 2022-10-21 PROCEDURE — 84484 ASSAY OF TROPONIN QUANT: CPT | Performed by: INTERNAL MEDICINE

## 2022-10-21 PROCEDURE — 81001 URINALYSIS AUTO W/SCOPE: CPT | Performed by: INTERNAL MEDICINE

## 2022-10-21 PROCEDURE — 87088 URINE BACTERIA CULTURE: CPT | Performed by: INTERNAL MEDICINE

## 2022-10-21 PROCEDURE — 87086 URINE CULTURE/COLONY COUNT: CPT | Performed by: INTERNAL MEDICINE

## 2022-10-21 PROCEDURE — 70450 CT HEAD WITHOUT CONTRAST: ICD-10-PCS | Mod: 26,,, | Performed by: RADIOLOGY

## 2022-10-21 PROCEDURE — 4010F PR ACE/ARB THEARPY RXD/TAKEN: ICD-10-PCS | Mod: CPTII,S$GLB,, | Performed by: INTERNAL MEDICINE

## 2022-10-21 PROCEDURE — 1125F PR PAIN SEVERITY QUANTIFIED, PAIN PRESENT: ICD-10-PCS | Mod: CPTII,S$GLB,, | Performed by: INTERNAL MEDICINE

## 2022-10-21 PROCEDURE — 70450 CT HEAD/BRAIN W/O DYE: CPT | Mod: 26,,, | Performed by: RADIOLOGY

## 2022-10-21 PROCEDURE — 1101F PT FALLS ASSESS-DOCD LE1/YR: CPT | Mod: CPTII,S$GLB,, | Performed by: INTERNAL MEDICINE

## 2022-10-21 PROCEDURE — 86140 C-REACTIVE PROTEIN: CPT | Performed by: INTERNAL MEDICINE

## 2022-10-21 PROCEDURE — 70450 CT HEAD/BRAIN W/O DYE: CPT | Mod: TC

## 2022-10-21 PROCEDURE — 4010F ACE/ARB THERAPY RXD/TAKEN: CPT | Mod: CPTII,S$GLB,, | Performed by: INTERNAL MEDICINE

## 2022-10-21 PROCEDURE — 87186 SC STD MICRODIL/AGAR DIL: CPT | Performed by: INTERNAL MEDICINE

## 2022-10-21 PROCEDURE — 3288F FALL RISK ASSESSMENT DOCD: CPT | Mod: CPTII,S$GLB,, | Performed by: INTERNAL MEDICINE

## 2022-10-21 PROCEDURE — 80053 COMPREHEN METABOLIC PANEL: CPT | Performed by: INTERNAL MEDICINE

## 2022-10-21 PROCEDURE — 83880 ASSAY OF NATRIURETIC PEPTIDE: CPT | Performed by: INTERNAL MEDICINE

## 2022-10-21 PROCEDURE — 84443 ASSAY THYROID STIM HORMONE: CPT | Performed by: INTERNAL MEDICINE

## 2022-10-21 PROCEDURE — 99999 PR PBB SHADOW E&M-EST. PATIENT-LVL III: ICD-10-PCS | Mod: PBBFAC,,, | Performed by: INTERNAL MEDICINE

## 2022-10-21 RX ORDER — CIPROFLOXACIN 500 MG/1
500 TABLET ORAL EVERY 12 HOURS
Qty: 14 TABLET | Refills: 0 | Status: SHIPPED | OUTPATIENT
Start: 2022-10-21 | End: 2022-10-24

## 2022-10-21 RX ORDER — DIAZEPAM 5 MG/1
5 TABLET ORAL EVERY 12 HOURS PRN
Qty: 60 TABLET | Refills: 0 | Status: SHIPPED | OUTPATIENT
Start: 2022-10-21 | End: 2022-11-20

## 2022-10-21 RX ORDER — CIPROFLOXACIN 500 MG/1
500 TABLET ORAL EVERY 12 HOURS
Qty: 14 TABLET | Refills: 0 | Status: SHIPPED | OUTPATIENT
Start: 2022-10-21 | End: 2022-10-21 | Stop reason: SDUPTHER

## 2022-10-23 LAB — BACTERIA UR CULT: ABNORMAL

## 2022-10-23 NOTE — PROGRESS NOTES
Subjective:       Patient ID: Fernie Maldonado Dr. is a 72 y.o. male.    Chief Complaint: anxiety with panic; continued weight loss    72-year-old nonsmoking are who is here brought in by his wife and brother for continued decline mentally and physically.  He continues lose weight and has lost 4 lb from last week.  Prior to this on this patient's was doing fine and dealing with BPH, changing of his thyroid supplementation from natural/armour thyroid to  levothyroxine. Also he has a longstanding history of an ascending aortic aneurysm as well as moderate aortic stenosis which he follows with Cardiology very closely with biyearly scans and ultrasounds. He has no symptoms of SOB, chest pain or lightheadedness.    In August the patient went to [place] and had stem cell injections subcutaneously throughout all of his trigger points  (knees, shoulders and hands) with Tami's jelly.  He did a billion units.  Then when they got back he his wife from [place] he contracted COVID. again a few months prior to that was changed from armour thyroid 120 mg to levothyroxine 100 mcg and then in 3 weeks time was increased to 150 mcg and I had seen him initially on 10/7 and he was indeed hyperthyroid.   Since August he has lost over 20 lb.  She does have a decreased appetite.  He feels he has lost his sense of taste and smell as well since having COVID.  There is question of whether this is some component of lung COVID.  His brother, Benji and his wife, shall commented that he cannot be alone during the day as he is a high fall risk.  He has developed a tremor which seems to be intentional and cognitively he is very scattered and at times obsessive and paranoid.  He then stated to his wife that he was afraid that she was going to leave and take their 10-year-old daughter last week.  His wife does work and travel some.  The patient describes a very difficult financial time right now with his multiple chiropractors clinics not doing  well as well as the stock market knee is concerned about many different things financially.      Review of Systems   Constitutional:  Positive for appetite change. Negative for activity change, diaphoresis, fatigue and fever.   HENT:  Negative for nasal congestion, ear pain, postnasal drip, sinus pressure/congestion, sore throat and trouble swallowing.         Loss of smell and taste - noticed this week   Eyes:  Negative for pain.   Respiratory:  Negative for cough, chest tightness, shortness of breath and wheezing.    Cardiovascular:  Negative for chest pain, palpitations and leg swelling.   Gastrointestinal:  Positive for constipation. Negative for abdominal distention, abdominal pain, blood in stool and diarrhea.   Endocrine: Negative for cold intolerance and heat intolerance.   Genitourinary:  Negative for decreased urine volume, difficulty urinating, dysuria, flank pain, frequency and hematuria.   Musculoskeletal:  Positive for back pain and gait problem. Negative for arthralgias, joint swelling, myalgias and neck pain.   Integumentary:  Negative for pallor, rash and wound.   Neurological:  Positive for tremors. Negative for syncope, weakness, light-headedness, numbness and headaches.   Hematological:  Negative for adenopathy.   Psychiatric/Behavioral:  Positive for agitation, decreased concentration, dysphoric mood and sleep disturbance. Negative for confusion, hallucinations, self-injury and suicidal ideas. The patient is nervous/anxious.        Objective:      Wt Readings from Last 3 Encounters:   10/21/22 71.5 kg (157 lb 8.3 oz)   10/13/22 73.2 kg (161 lb 6 oz)   09/29/22 77.3 kg (170 lb 4.9 oz)     Temp Readings from Last 3 Encounters:   10/13/22 98.2 °F (36.8 °C)   09/29/22 98 °F (36.7 °C)   06/04/22 98.5 °F (36.9 °C) (Oral)     BP Readings from Last 3 Encounters:   10/21/22 (!) 149/73   10/13/22 (!) 145/77   09/29/22 134/76     Pulse Readings from Last 3 Encounters:   10/21/22 62   10/13/22 63   09/29/22  67     Physical Exam  Constitutional:       General: He is not in acute distress.     Appearance: Normal appearance.   HENT:      Head: Normocephalic and atraumatic.   Eyes:      Conjunctiva/sclera: Conjunctivae normal.   Cardiovascular:      Rate and Rhythm: Normal rate and regular rhythm.      Heart sounds: Normal heart sounds.   Pulmonary:      Effort: Pulmonary effort is normal.      Breath sounds: Normal breath sounds.   Abdominal:      Palpations: Abdomen is soft.      Tenderness: There is abdominal tenderness in the periumbilical area.      Comments: mild   Musculoskeletal:         General: Normal range of motion.      Cervical back: Normal range of motion and neck supple.   Skin:     General: Skin is warm and dry.   Neurological:      General: No focal deficit present.      Mental Status: He is alert.   Psychiatric:         Mood and Affect: Mood normal.         Behavior: Behavior normal.         Thought Content: Thought content normal.         Judgment: Judgment normal.       Assessment:       Abnormal weight loss  -     CT Chest Abdomen Pelvis W W/O Contrast (XPD); Future; Expected date: 10/21/2022    Nervous breakdown  Comments:  multifactorial - with cognitive impairment in the form of paranoia and obesseing   Orders:  -     Ambulatory referral/consult to Psychiatry; Future; Expected date: 10/30/2022    Adjustment insomnia  Comments:  above likely secondary to not sleeping but a few hours a night and ruminating   Orders:  -     Ambulatory referral/consult to Psychiatry; Future; Expected date: 10/28/2022  -     diazePAM (VALIUM) 5 MG tablet; Take 1 tablet (5 mg total) by mouth every 12 (twelve) hours as needed for Anxiety or Insomnia.  Dispense: 60 tablet; Refill: 0  -     Ambulatory referral/consult to Psychiatry; Future; Expected date: 10/30/2022    DILMA (generalized anxiety disorder)  -     Ambulatory referral/consult to Psychiatry; Future; Expected date: 10/28/2022  -     diazePAM (VALIUM) 5 MG tablet;  Take 1 tablet (5 mg total) by mouth every 12 (twelve) hours as needed for Anxiety or Insomnia.  Dispense: 60 tablet; Refill: 0  -     Ambulatory referral/consult to Psychiatry; Future; Expected date: 10/30/2022    Tremor of unknown origin- appears essential   -     Ambulatory referral/consult to Neurology; Future; Expected date: 10/28/2022  -     CT Chest Abdomen Pelvis W W/O Contrast (XPD); Future; Expected date: 10/21/2022- need to rule out Neurodegenerative disorders such as PD    Unstable gait  Comments:  slow shuffling gate visualized (pt is bowlegged however) - risk of falling   Orders:  -     Ambulatory referral/consult to Neurology; Future; Expected date: 10/28/2022- as above     Acute cystitis without hematuria- with history of chronic prostatitis   Comments:  recently treated with one month of doxycyline with Dr Pacheco   also had elevated PSA and work up with MRI of pelvis that was negative   Orders:  -     ciprofloxacin HCl (CIPRO) 500 MG tablet; Take 1 tablet (500 mg total) by mouth every 12 (twelve) hours.  Dispense: 14 tablet; Refill: 0    Generalized abdominal pain- on palpation today   -     CT Chest Abdomen Pelvis W W/O Contrast (XPD); Future; Expected date: 10/21/2022    Aneurysm of ascending aorta without rupture- large 5 cm   Comments:  follows with dr walker  Orders:  -     CT Chest Abdomen Pelvis W W/O Contrast (XPD); Future; Expected date: 10/21/2022    Aortic valve stenosis, congenital- moderate   Comments:  as above      Time spent with the patient was 40 min and 50 percent of that was in face to face contact

## 2022-10-24 ENCOUNTER — HOSPITAL ENCOUNTER (OUTPATIENT)
Dept: RADIOLOGY | Facility: HOSPITAL | Age: 72
Discharge: HOME | End: 2022-10-24
Attending: INTERNAL MEDICINE
Payer: COMMERCIAL

## 2022-10-24 ENCOUNTER — CLINICAL SUPPORT (OUTPATIENT)
Dept: INTERNAL MEDICINE | Facility: CLINIC | Age: 72
End: 2022-10-24
Payer: COMMERCIAL

## 2022-10-24 ENCOUNTER — TELEPHONE (OUTPATIENT)
Dept: INTERNAL MEDICINE | Facility: CLINIC | Age: 72
End: 2022-10-24

## 2022-10-24 ENCOUNTER — OFFICE VISIT (OUTPATIENT)
Dept: INTERNAL MEDICINE | Facility: CLINIC | Age: 72
End: 2022-10-24
Payer: COMMERCIAL

## 2022-10-24 ENCOUNTER — TELEPHONE (OUTPATIENT)
Dept: NEUROLOGY | Facility: CLINIC | Age: 72
End: 2022-10-24
Payer: COMMERCIAL

## 2022-10-24 VITALS
SYSTOLIC BLOOD PRESSURE: 119 MMHG | HEART RATE: 57 BPM | BODY MASS INDEX: 21.77 KG/M2 | DIASTOLIC BLOOD PRESSURE: 68 MMHG | WEIGHT: 156.06 LBS

## 2022-10-24 DIAGNOSIS — I10 PRIMARY HYPERTENSION: ICD-10-CM

## 2022-10-24 DIAGNOSIS — N40.1 BENIGN PROSTATIC HYPERPLASIA WITH INCOMPLETE BLADDER EMPTYING: ICD-10-CM

## 2022-10-24 DIAGNOSIS — R25.1 TREMOR OF UNKNOWN ORIGIN: ICD-10-CM

## 2022-10-24 DIAGNOSIS — R07.89 ATYPICAL CHEST PAIN: ICD-10-CM

## 2022-10-24 DIAGNOSIS — R39.14 BENIGN PROSTATIC HYPERPLASIA WITH INCOMPLETE BLADDER EMPTYING: ICD-10-CM

## 2022-10-24 DIAGNOSIS — R63.4 ABNORMAL WEIGHT LOSS: ICD-10-CM

## 2022-10-24 DIAGNOSIS — I71.21 ANEURYSM OF ASCENDING AORTA WITHOUT RUPTURE: Chronic | ICD-10-CM

## 2022-10-24 DIAGNOSIS — R41.0 CONFUSION: ICD-10-CM

## 2022-10-24 DIAGNOSIS — R10.84 GENERALIZED ABDOMINAL PAIN: ICD-10-CM

## 2022-10-24 DIAGNOSIS — I71.21 ANEURYSM OF ASCENDING AORTA WITHOUT RUPTURE: ICD-10-CM

## 2022-10-24 DIAGNOSIS — R25.1 TREMOR: ICD-10-CM

## 2022-10-24 DIAGNOSIS — N30.00 ACUTE CYSTITIS WITHOUT HEMATURIA: Primary | ICD-10-CM

## 2022-10-24 LAB
D DIMER PPP IA.FEU-MCNC: 0.66 MG/L FEU
HIV 1+2 AB+HIV1 P24 AG SERPL QL IA: NORMAL

## 2022-10-24 PROCEDURE — 1160F RVW MEDS BY RX/DR IN RCRD: CPT | Mod: CPTII,S$GLB,, | Performed by: INTERNAL MEDICINE

## 2022-10-24 PROCEDURE — 1101F PR PT FALLS ASSESS DOC 0-1 FALLS W/OUT INJ PAST YR: ICD-10-PCS | Mod: CPTII,S$GLB,, | Performed by: INTERNAL MEDICINE

## 2022-10-24 PROCEDURE — 1125F AMNT PAIN NOTED PAIN PRSNT: CPT | Mod: CPTII,S$GLB,, | Performed by: INTERNAL MEDICINE

## 2022-10-24 PROCEDURE — 74177 CT CHEST ABDOMEN PELVIS WITH CONTRAST (XPD): ICD-10-PCS | Mod: 26,,, | Performed by: RADIOLOGY

## 2022-10-24 PROCEDURE — 99214 OFFICE O/P EST MOD 30 MIN: CPT | Mod: S$GLB,,, | Performed by: INTERNAL MEDICINE

## 2022-10-24 PROCEDURE — 3288F PR FALLS RISK ASSESSMENT DOCUMENTED: ICD-10-PCS | Mod: CPTII,S$GLB,, | Performed by: INTERNAL MEDICINE

## 2022-10-24 PROCEDURE — 3078F PR MOST RECENT DIASTOLIC BLOOD PRESSURE < 80 MM HG: ICD-10-PCS | Mod: CPTII,S$GLB,, | Performed by: INTERNAL MEDICINE

## 2022-10-24 PROCEDURE — 1159F MED LIST DOCD IN RCRD: CPT | Mod: CPTII,S$GLB,, | Performed by: INTERNAL MEDICINE

## 2022-10-24 PROCEDURE — 71260 CT CHEST ABDOMEN PELVIS WITH CONTRAST (XPD): ICD-10-PCS | Mod: 26,,, | Performed by: RADIOLOGY

## 2022-10-24 PROCEDURE — 1125F PR PAIN SEVERITY QUANTIFIED, PAIN PRESENT: ICD-10-PCS | Mod: CPTII,S$GLB,, | Performed by: INTERNAL MEDICINE

## 2022-10-24 PROCEDURE — 85379 FIBRIN DEGRADATION QUANT: CPT | Performed by: INTERNAL MEDICINE

## 2022-10-24 PROCEDURE — 25500020 PHARM REV CODE 255: Performed by: INTERNAL MEDICINE

## 2022-10-24 PROCEDURE — 3078F DIAST BP <80 MM HG: CPT | Mod: CPTII,S$GLB,, | Performed by: INTERNAL MEDICINE

## 2022-10-24 PROCEDURE — 99999 PR PBB SHADOW E&M-EST. PATIENT-LVL I: ICD-10-PCS | Mod: PBBFAC,,,

## 2022-10-24 PROCEDURE — 3074F PR MOST RECENT SYSTOLIC BLOOD PRESSURE < 130 MM HG: ICD-10-PCS | Mod: CPTII,S$GLB,, | Performed by: INTERNAL MEDICINE

## 2022-10-24 PROCEDURE — 4010F PR ACE/ARB THEARPY RXD/TAKEN: ICD-10-PCS | Mod: CPTII,S$GLB,, | Performed by: INTERNAL MEDICINE

## 2022-10-24 PROCEDURE — 99999 PR PBB SHADOW E&M-EST. PATIENT-LVL III: ICD-10-PCS | Mod: PBBFAC,,, | Performed by: INTERNAL MEDICINE

## 2022-10-24 PROCEDURE — 87389 HIV-1 AG W/HIV-1&-2 AB AG IA: CPT | Performed by: INTERNAL MEDICINE

## 2022-10-24 PROCEDURE — 3074F SYST BP LT 130 MM HG: CPT | Mod: CPTII,S$GLB,, | Performed by: INTERNAL MEDICINE

## 2022-10-24 PROCEDURE — 99214 PR OFFICE/OUTPT VISIT, EST, LEVL IV, 30-39 MIN: ICD-10-PCS | Mod: S$GLB,,, | Performed by: INTERNAL MEDICINE

## 2022-10-24 PROCEDURE — 71260 CT THORAX DX C+: CPT | Mod: 26,,, | Performed by: RADIOLOGY

## 2022-10-24 PROCEDURE — 71260 CT THORAX DX C+: CPT | Mod: TC

## 2022-10-24 PROCEDURE — A9698 NON-RAD CONTRAST MATERIALNOC: HCPCS | Performed by: INTERNAL MEDICINE

## 2022-10-24 PROCEDURE — 99999 PR PBB SHADOW E&M-EST. PATIENT-LVL III: CPT | Mod: PBBFAC,,, | Performed by: INTERNAL MEDICINE

## 2022-10-24 PROCEDURE — 3288F FALL RISK ASSESSMENT DOCD: CPT | Mod: CPTII,S$GLB,, | Performed by: INTERNAL MEDICINE

## 2022-10-24 PROCEDURE — 4010F ACE/ARB THERAPY RXD/TAKEN: CPT | Mod: CPTII,S$GLB,, | Performed by: INTERNAL MEDICINE

## 2022-10-24 PROCEDURE — 74177 CT ABD & PELVIS W/CONTRAST: CPT | Mod: TC

## 2022-10-24 PROCEDURE — 99999 PR PBB SHADOW E&M-EST. PATIENT-LVL I: CPT | Mod: PBBFAC,,,

## 2022-10-24 PROCEDURE — 1160F PR REVIEW ALL MEDS BY PRESCRIBER/CLIN PHARMACIST DOCUMENTED: ICD-10-PCS | Mod: CPTII,S$GLB,, | Performed by: INTERNAL MEDICINE

## 2022-10-24 PROCEDURE — 1101F PT FALLS ASSESS-DOCD LE1/YR: CPT | Mod: CPTII,S$GLB,, | Performed by: INTERNAL MEDICINE

## 2022-10-24 PROCEDURE — 1159F PR MEDICATION LIST DOCUMENTED IN MEDICAL RECORD: ICD-10-PCS | Mod: CPTII,S$GLB,, | Performed by: INTERNAL MEDICINE

## 2022-10-24 PROCEDURE — 74177 CT ABD & PELVIS W/CONTRAST: CPT | Mod: 26,,, | Performed by: RADIOLOGY

## 2022-10-24 RX ORDER — AMOXICILLIN 875 MG/1
875 TABLET, FILM COATED ORAL 2 TIMES DAILY
Qty: 20 TABLET | Refills: 0 | Status: SHIPPED | OUTPATIENT
Start: 2022-10-24

## 2022-10-24 RX ADMIN — IOHEXOL 100 ML: 350 INJECTION, SOLUTION INTRAVENOUS at 03:10

## 2022-10-24 RX ADMIN — BARIUM SULFATE 450 ML: 20 SUSPENSION ORAL at 03:10

## 2022-10-24 NOTE — TELEPHONE ENCOUNTER
Message received via Teams for scheduling asap. Pt was being seen in Clinic  by Dr. Chairez last week for new onset tremor, wt loss and gait instability. Concern for underlying movement d/o.     While in clinic, appt offered/accepted for October 31st @ 11am with Dr. Day at [facility] [facility] tower.

## 2022-10-26 ENCOUNTER — HOSPITAL ENCOUNTER (OUTPATIENT)
Dept: RADIOLOGY | Facility: HOSPITAL | Age: 72
Discharge: HOME | End: 2022-10-26
Attending: INTERNAL MEDICINE
Payer: COMMERCIAL

## 2022-10-26 ENCOUNTER — OFFICE VISIT (OUTPATIENT)
Dept: UROLOGY | Facility: CLINIC | Age: 72
End: 2022-10-26
Payer: COMMERCIAL

## 2022-10-26 VITALS — HEIGHT: 71 IN | BODY MASS INDEX: 21.88 KG/M2 | WEIGHT: 156.31 LBS

## 2022-10-26 DIAGNOSIS — N40.1 BENIGN PROSTATIC HYPERPLASIA WITH INCOMPLETE BLADDER EMPTYING: ICD-10-CM

## 2022-10-26 DIAGNOSIS — R39.14 BENIGN PROSTATIC HYPERPLASIA WITH INCOMPLETE BLADDER EMPTYING: ICD-10-CM

## 2022-10-26 DIAGNOSIS — R63.4 ABNORMAL WEIGHT LOSS: ICD-10-CM

## 2022-10-26 DIAGNOSIS — R41.0 CONFUSION: ICD-10-CM

## 2022-10-26 DIAGNOSIS — N30.00 ACUTE CYSTITIS WITHOUT HEMATURIA: ICD-10-CM

## 2022-10-26 DIAGNOSIS — R25.1 TREMOR: ICD-10-CM

## 2022-10-26 PROCEDURE — 70551 MRI BRAIN STEM W/O DYE: CPT | Mod: TC,PO

## 2022-10-26 PROCEDURE — 4010F ACE/ARB THERAPY RXD/TAKEN: CPT | Mod: CPTII,S$GLB,, | Performed by: UROLOGY

## 2022-10-26 PROCEDURE — 1159F MED LIST DOCD IN RCRD: CPT | Mod: CPTII,S$GLB,, | Performed by: UROLOGY

## 2022-10-26 PROCEDURE — 99999 PR PBB SHADOW E&M-EST. PATIENT-LVL V: ICD-10-PCS | Mod: PBBFAC,,, | Performed by: UROLOGY

## 2022-10-26 PROCEDURE — 1160F PR REVIEW ALL MEDS BY PRESCRIBER/CLIN PHARMACIST DOCUMENTED: ICD-10-PCS | Mod: CPTII,S$GLB,, | Performed by: UROLOGY

## 2022-10-26 PROCEDURE — 70551 MRI BRAIN WITHOUT CONTRAST: ICD-10-PCS | Mod: 26,,, | Performed by: RADIOLOGY

## 2022-10-26 PROCEDURE — 99204 OFFICE O/P NEW MOD 45 MIN: CPT | Mod: S$GLB,,, | Performed by: UROLOGY

## 2022-10-26 PROCEDURE — 87086 URINE CULTURE/COLONY COUNT: CPT | Performed by: UROLOGY

## 2022-10-26 PROCEDURE — 1126F AMNT PAIN NOTED NONE PRSNT: CPT | Mod: CPTII,S$GLB,, | Performed by: UROLOGY

## 2022-10-26 PROCEDURE — 99204 PR OFFICE/OUTPT VISIT, NEW, LEVL IV, 45-59 MIN: ICD-10-PCS | Mod: S$GLB,,, | Performed by: UROLOGY

## 2022-10-26 PROCEDURE — 70551 MRI BRAIN STEM W/O DYE: CPT | Mod: 26,,, | Performed by: RADIOLOGY

## 2022-10-26 PROCEDURE — 1126F PR PAIN SEVERITY QUANTIFIED, NO PAIN PRESENT: ICD-10-PCS | Mod: CPTII,S$GLB,, | Performed by: UROLOGY

## 2022-10-26 PROCEDURE — 99999 PR PBB SHADOW E&M-EST. PATIENT-LVL V: CPT | Mod: PBBFAC,,, | Performed by: UROLOGY

## 2022-10-26 PROCEDURE — 4010F PR ACE/ARB THEARPY RXD/TAKEN: ICD-10-PCS | Mod: CPTII,S$GLB,, | Performed by: UROLOGY

## 2022-10-26 PROCEDURE — 1160F RVW MEDS BY RX/DR IN RCRD: CPT | Mod: CPTII,S$GLB,, | Performed by: UROLOGY

## 2022-10-26 PROCEDURE — 1159F PR MEDICATION LIST DOCUMENTED IN MEDICAL RECORD: ICD-10-PCS | Mod: CPTII,S$GLB,, | Performed by: UROLOGY

## 2022-10-26 RX ORDER — AMOXICILLIN 875 MG/1
875 TABLET, FILM COATED ORAL 2 TIMES DAILY
Qty: 28 TABLET | Refills: 1 | Status: SHIPPED | OUTPATIENT
Start: 2022-10-26

## 2022-10-26 RX ORDER — SERTRALINE HYDROCHLORIDE 50 MG/1
50 TABLET, FILM COATED ORAL DAILY
COMMUNITY
End: 2022-11-03

## 2022-10-26 NOTE — PROGRESS NOTES
Subjective:       Patient ID: Fernie Maldonado Dr. is a 72 y.o. male.    Chief Complaint: Urinary Tract Infection (Uti resistant to most antibiotics. )    HPI patient is here for recent UTI.  It was Enterococcus and sensitive to Augmentin.  Still has an occasional twinge of pain his urine looks good today I am going to keep him on antibiotics for couple of more weeks and he will need a UTI workup.  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    Empty sella     Family history of ischemic heart disease 1/14/2014    Hypertension     Hypogonadism male 2004    Hypothyroidism 1/14/2014    Diagnosed 2003.    Nonspecific abnormal results of endocrine function study 6/20/2014    Statin intolerance     SVT (supraventricular tachycardia)        Past Surgical History:   Procedure Laterality Date    HAIR TRANSPLANT      TONSILLECTOMY, ADENOIDECTOMY  1957       Family History   Problem Relation Age of Onset    Cancer Mother 58        breast mets to lung    Heart disease Father     Cancer Sister     Thyroid disease Sister     Cancer Brother         hodgkins lymphoma    Cancer Brother         bladder    Diabetes Son         type 1       Social History     Socioeconomic History    Marital status:    Tobacco Use    Smoking status: Never    Smokeless tobacco: Never   Substance and Sexual Activity    Alcohol use: Yes     Alcohol/week: 6.0 standard drinks     Types: 4 Glasses of wine, 2 Shots of liquor per week     Comment: Socially  3-4x week    Drug use: No    Sexual activity: Yes     Partners: Female   Social History Narrative    Exercises (weight lifting). Very little cardio.  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    Medications:    Current Outpatient Medications:     amoxicillin (AMOXIL) 875 MG tablet, Take 1 tablet (875 mg total) by mouth 2 (two) times daily., Disp: 20 tablet, Rfl: 0    levothyroxine (SYNTHROID) 75 MCG tablet, Take 1 tablet (75 mcg total) by mouth before breakfast., Disp: 30 tablet, Rfl: 1    sertraline (ZOLOFT) 50 MG tablet, Take 50 mg by mouth once daily., Disp: , Rfl:     telmisartan (MICARDIS) 40 MG Tab, Take 20 mg by mouth once daily. Pt uses as needed per bp, Disp: , Rfl:     testosterone cypionate (DEPOTESTOTERONE CYPIONATE) 100 mg/mL injection, Inject 1 mL (100 mg total) into the muscle once a week., Disp: 10 mL, Rfl: 0    traZODone (DESYREL) 100 MG tablet, Take 1 tablet (100 mg total) by mouth every evening., Disp: 30 tablet, Rfl: 1    ALPRAZolam (XANAX) 1 MG tablet, Take 1 tablet (1 mg total) by mouth nightly as needed for Anxiety. (Patient not taking: Reported on 10/26/2022), Disp: 30 tablet, Rfl: 1    coenzyme Q10 400 mg Cap, Take 1 capsule by mouth once daily., Disp: , Rfl:     cyanocobalamin 1,000 mcg/mL injection, Inject 1 mL (1,000 mcg total) into the muscle every 30 days. (Patient not taking: Reported on 10/26/2022), Disp: 10 mL, Rfl: 1    diazePAM (VALIUM) 5 MG tablet, Take 1 tablet (5 mg total) by mouth every 12 (twelve) hours as needed for Anxiety or Insomnia. (Patient not taking: Reported on 10/26/2022), Disp: 60 tablet, Rfl: 0    dutasteride (AVODART) 0.5 mg capsule, Take 0.5 mg by mouth once daily., Disp: , Rfl:     evolocumab (REPATHA SURECLICK) 140 mg/mL PnIj, Inject into the skin every 14 (fourteen) days., Disp: , Rfl:     fish oil-fat acid comb.8-hb137 1,200 mg (400 [id number]) Cap, Take 3 capsules by mouth once daily. , Disp: , Rfl:     IVERMECTIN ORAL, TAKE 2 CAPSULES PLACE TWICE A WEEK, Disp: , Rfl:     multivitamin-zinc oxide 7.5 mg Chew, Take 1 tablet  by mouth once daily. Life extension mix, Disp: , Rfl:     propranoloL (INDERAL) 20 MG tablet, Take 1 tablet (20 mg total) by mouth 2 (two) times daily as needed (panic or anxiousness). (Patient not taking: Reported on 10/26/2022), Disp: 60 tablet, Rfl: 0    triamcinolone acetonide 0.1% (KENALOG) 0.1 % cream, 2 (two) times daily. Apply to affected area, Disp: , Rfl:     Review of Systems   Constitutional:  Negative for activity change, appetite change, chills, diaphoresis, fatigue, fever and unexpected weight change.   HENT:  Negative for congestion, dental problem, hearing loss, mouth sores, postnasal drip, rhinorrhea, sinus pressure and trouble swallowing.    Eyes:  Negative for pain, discharge and itching.   Respiratory:  Negative for apnea, cough, choking, chest tightness, shortness of breath and wheezing.    Cardiovascular:  Negative for chest pain, palpitations and leg swelling.   Gastrointestinal:  Negative for abdominal distention, abdominal pain, anal bleeding, blood in stool, constipation, diarrhea, nausea, rectal pain and vomiting.   Endocrine: Negative for polydipsia and polyuria.   Genitourinary:  Negative for decreased urine volume, difficulty urinating, dysuria, enuresis, flank pain, frequency, genital sores, hematuria, penile discharge, penile pain, penile swelling, scrotal swelling, testicular pain and urgency.   Musculoskeletal:  Negative for arthralgias, back pain and myalgias.   Skin:  Negative for color change, rash and wound.   Neurological:  Negative for dizziness, syncope, speech difficulty, light-headedness and headaches.   Hematological:  Negative for adenopathy. Does not bruise/bleed easily.   Psychiatric/Behavioral:  Negative for behavioral problems, confusion, hallucinations and sleep disturbance.      Objective:      Physical Exam  Constitutional:       Appearance: He is well-developed.   HENT:      Head: Normocephalic.   Cardiovascular:      Rate and Rhythm: Normal rate.   Pulmonary:       Effort: Pulmonary effort is normal.   Abdominal:      Palpations: Abdomen is soft.   Genitourinary:     Comments: Prostate exam deferred to time of cystoscopy  Skin:     General: Skin is warm.   Neurological:      Mental Status: He is alert.       Assessment:       1. Acute cystitis without hematuria    2. Benign prostatic hyperplasia with incomplete bladder emptying          Plan:       Fernie was seen today for urinary tract infection.    Diagnoses and all orders for this visit:    Acute cystitis without hematuria  -     Ambulatory referral/consult to Urology  -     Urine culture; Future  -     US Retroperitoneal Complete; Future  -     Cystoscopy; Future    Benign prostatic hyperplasia with incomplete bladder emptying  -     Ambulatory referral/consult to Urology

## 2022-10-27 LAB — BACTERIA UR CULT: NO GROWTH

## 2022-10-31 ENCOUNTER — OFFICE VISIT (OUTPATIENT)
Dept: NEUROLOGY | Facility: CLINIC | Age: 72
End: 2022-10-31
Payer: COMMERCIAL

## 2022-10-31 VITALS
SYSTOLIC BLOOD PRESSURE: 143 MMHG | HEART RATE: 61 BPM | DIASTOLIC BLOOD PRESSURE: 78 MMHG | BODY MASS INDEX: 21.8 KG/M2 | HEIGHT: 71 IN

## 2022-10-31 DIAGNOSIS — R26.81 UNSTABLE GAIT: Primary | ICD-10-CM

## 2022-10-31 DIAGNOSIS — R41.89 COGNITIVE CHANGES: ICD-10-CM

## 2022-10-31 DIAGNOSIS — R63.4 ABNORMAL WEIGHT LOSS: ICD-10-CM

## 2022-10-31 DIAGNOSIS — F32.A DEPRESSION, UNSPECIFIED DEPRESSION TYPE: ICD-10-CM

## 2022-10-31 DIAGNOSIS — R25.1 TREMOR OF UNKNOWN ORIGIN: ICD-10-CM

## 2022-10-31 PROCEDURE — 99205 PR OFFICE/OUTPT VISIT, NEW, LEVL V, 60-74 MIN: ICD-10-PCS | Mod: S$GLB,,, | Performed by: STUDENT IN AN ORGANIZED HEALTH CARE EDUCATION/TRAINING PROGRAM

## 2022-10-31 PROCEDURE — 3288F PR FALLS RISK ASSESSMENT DOCUMENTED: ICD-10-PCS | Mod: CPTII,S$GLB,, | Performed by: STUDENT IN AN ORGANIZED HEALTH CARE EDUCATION/TRAINING PROGRAM

## 2022-10-31 PROCEDURE — 3078F DIAST BP <80 MM HG: CPT | Mod: CPTII,S$GLB,, | Performed by: STUDENT IN AN ORGANIZED HEALTH CARE EDUCATION/TRAINING PROGRAM

## 2022-10-31 PROCEDURE — 99999 PR PBB SHADOW E&M-EST. PATIENT-LVL III: ICD-10-PCS | Mod: PBBFAC,,, | Performed by: STUDENT IN AN ORGANIZED HEALTH CARE EDUCATION/TRAINING PROGRAM

## 2022-10-31 PROCEDURE — 4010F ACE/ARB THERAPY RXD/TAKEN: CPT | Mod: CPTII,S$GLB,, | Performed by: STUDENT IN AN ORGANIZED HEALTH CARE EDUCATION/TRAINING PROGRAM

## 2022-10-31 PROCEDURE — 1126F PR PAIN SEVERITY QUANTIFIED, NO PAIN PRESENT: ICD-10-PCS | Mod: CPTII,S$GLB,, | Performed by: STUDENT IN AN ORGANIZED HEALTH CARE EDUCATION/TRAINING PROGRAM

## 2022-10-31 PROCEDURE — 4010F PR ACE/ARB THEARPY RXD/TAKEN: ICD-10-PCS | Mod: CPTII,S$GLB,, | Performed by: STUDENT IN AN ORGANIZED HEALTH CARE EDUCATION/TRAINING PROGRAM

## 2022-10-31 PROCEDURE — 1126F AMNT PAIN NOTED NONE PRSNT: CPT | Mod: CPTII,S$GLB,, | Performed by: STUDENT IN AN ORGANIZED HEALTH CARE EDUCATION/TRAINING PROGRAM

## 2022-10-31 PROCEDURE — 99205 OFFICE O/P NEW HI 60 MIN: CPT | Mod: S$GLB,,, | Performed by: STUDENT IN AN ORGANIZED HEALTH CARE EDUCATION/TRAINING PROGRAM

## 2022-10-31 PROCEDURE — 1100F PTFALLS ASSESS-DOCD GE2>/YR: CPT | Mod: CPTII,S$GLB,, | Performed by: STUDENT IN AN ORGANIZED HEALTH CARE EDUCATION/TRAINING PROGRAM

## 2022-10-31 PROCEDURE — 1160F PR REVIEW ALL MEDS BY PRESCRIBER/CLIN PHARMACIST DOCUMENTED: ICD-10-PCS | Mod: CPTII,S$GLB,, | Performed by: STUDENT IN AN ORGANIZED HEALTH CARE EDUCATION/TRAINING PROGRAM

## 2022-10-31 PROCEDURE — 99417 PROLNG OP E/M EACH 15 MIN: CPT | Mod: S$GLB,,, | Performed by: STUDENT IN AN ORGANIZED HEALTH CARE EDUCATION/TRAINING PROGRAM

## 2022-10-31 PROCEDURE — 1159F MED LIST DOCD IN RCRD: CPT | Mod: CPTII,S$GLB,, | Performed by: STUDENT IN AN ORGANIZED HEALTH CARE EDUCATION/TRAINING PROGRAM

## 2022-10-31 PROCEDURE — 99999 PR PBB SHADOW E&M-EST. PATIENT-LVL III: CPT | Mod: PBBFAC,,, | Performed by: STUDENT IN AN ORGANIZED HEALTH CARE EDUCATION/TRAINING PROGRAM

## 2022-10-31 PROCEDURE — 1100F PR PT FALLS ASSESS DOC 2+ FALLS/FALL W/INJURY/YR: ICD-10-PCS | Mod: CPTII,S$GLB,, | Performed by: STUDENT IN AN ORGANIZED HEALTH CARE EDUCATION/TRAINING PROGRAM

## 2022-10-31 PROCEDURE — 1159F PR MEDICATION LIST DOCUMENTED IN MEDICAL RECORD: ICD-10-PCS | Mod: CPTII,S$GLB,, | Performed by: STUDENT IN AN ORGANIZED HEALTH CARE EDUCATION/TRAINING PROGRAM

## 2022-10-31 PROCEDURE — 3078F PR MOST RECENT DIASTOLIC BLOOD PRESSURE < 80 MM HG: ICD-10-PCS | Mod: CPTII,S$GLB,, | Performed by: STUDENT IN AN ORGANIZED HEALTH CARE EDUCATION/TRAINING PROGRAM

## 2022-10-31 PROCEDURE — 3077F SYST BP >= 140 MM HG: CPT | Mod: CPTII,S$GLB,, | Performed by: STUDENT IN AN ORGANIZED HEALTH CARE EDUCATION/TRAINING PROGRAM

## 2022-10-31 PROCEDURE — 1160F RVW MEDS BY RX/DR IN RCRD: CPT | Mod: CPTII,S$GLB,, | Performed by: STUDENT IN AN ORGANIZED HEALTH CARE EDUCATION/TRAINING PROGRAM

## 2022-10-31 PROCEDURE — 99417 PR PROLONGED SVC, OUTPT, W/WO DIRECT PT CONTACT,  EA ADDTL 15 MIN: ICD-10-PCS | Mod: S$GLB,,, | Performed by: STUDENT IN AN ORGANIZED HEALTH CARE EDUCATION/TRAINING PROGRAM

## 2022-10-31 PROCEDURE — 3077F PR MOST RECENT SYSTOLIC BLOOD PRESSURE >= 140 MM HG: ICD-10-PCS | Mod: CPTII,S$GLB,, | Performed by: STUDENT IN AN ORGANIZED HEALTH CARE EDUCATION/TRAINING PROGRAM

## 2022-10-31 PROCEDURE — 3288F FALL RISK ASSESSMENT DOCD: CPT | Mod: CPTII,S$GLB,, | Performed by: STUDENT IN AN ORGANIZED HEALTH CARE EDUCATION/TRAINING PROGRAM

## 2022-10-31 NOTE — PROGRESS NOTES
"Name: Fernie Maldonado Dr.  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 His symptom onset is not entirely clear but he began having some difficulty with traveling that dates back to August of this year. He traveled to [place] and had stem cells placed injected into trigger points and two weeks later developed COVID. Also during this time his thyroid medication was altered causing him to be in a hyperthyroid state. Following all of this he has progressively worsened with time. He is not eating due to poor appetite, anosmia and inability to taste (which did not start immediately after COVID). He has lost a total of about 25-30 lbs in the last month. He has had CT C/A/P and MRI brain which was largely unrevealing. He is currently still being treated for a UTI which he believes is related to his poor PO hydration and prostate issues. He feels that his mouth is extremely dry and he has a difficult swallowing solids due to this. He is not sleeping well regardless of nightly trazodone and valium as he feels that his mind is constantly racing. His wife and daughter have concerns about a shuffling gait and imbalance. He reports good sensation in his feet. He has had a few falls but this was thought to be related to medication effect (valium and trazodone) causing sedation. He has become increasingly obsessive about finances and his lack of sleep and experiences paranoia along with some delusional thoughts. For example, he was convinced his weight loss was due to HIV and encouraged his wife to "stay away". He also grown to be a less affectionate person " "and has pushed away family members which is unlike him. He was started on Sertraline for depression < 4 weeks ago without benefit noted as of today. He does have a history of depression with suicidality several years ago during a time of financial distress. The only life stressor around the onset of his symptoms was that of his wife being hospitalized and found to have lung cancer and he expresses that finances are a big stress.     Current Mvmt Medications:  Zoloft 50mg daily - 3 weeks ago    Prior Mvmt Medication Trials:  None     Nonmotor ROS:  Smell/Taste: no taste or smell; did not start with COVID - has affected his appetite   Voice/Swallowing: hypophonia and some dysphagia with solids due to dry mouth   Gait/Falls: some imbalance and occasional shuffling gait, occasional falls   Exercise: previously a heavy exercise, has lost interest.   Dizziness: none   Hydration: will drink 1-2 glasses of water/day as he does not want to have to urinate   Urinary Issues: presumed prostate issues, currently being treated for UTI  Constipation: can go a week + with no BM. Uses prunes and occasional metamucil.  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    Social History: The patient  reports that he has never smoked. He has never used smokeless tobacco. He reports current alcohol use of about 6.0 standard drinks per week. He reports that he does not use drugs.  Not drinking much anymore regularly.  No drug use.   No longer practicing as of recently.     Family History:   Brothers with cancer - bladder and blood   No parkinsonism.   Family has had prostate cancer.     Allergies: Patient has no known allergies.     Meds:   Current Outpatient Medications on File Prior to Visit   Medication Sig Dispense Refill    amoxicillin (AMOXIL) 875 MG tablet Take 1 tablet (875 mg total) by mouth 2 (two) times daily. 20 tablet 0    amoxicillin (AMOXIL) 875 MG tablet Take 1 tablet (875 mg total) by mouth 2 (two) times daily. 28 tablet 1    diazePAM (VALIUM) 5 MG tablet Take 1 tablet (5 mg total) by mouth every 12 (twelve) hours as needed for Anxiety or Insomnia. 60 tablet 0    evolocumab (REPATHA SURECLICK) 140 mg/mL PnIj Inject into the skin every 14 (fourteen) days.      fish oil-fat acid comb.8-hb137 1,200 mg (400 [id number]) Cap Take 3 capsules by mouth once daily.       levothyroxine (SYNTHROID) 75 MCG tablet Take 1 tablet (75 mcg total) by mouth before breakfast. 30 tablet 1    sertraline (ZOLOFT) 50 MG tablet Take 50 mg by mouth once daily.      traZODone (DESYREL) 100 MG tablet Take 1 tablet (100 mg total) by mouth every evening. 30 tablet 1    ALPRAZolam (XANAX) 1 MG tablet Take 1 tablet (1 mg total) by mouth nightly as needed for Anxiety. (Patient not taking: Reported on 10/26/2022) 30 tablet 1    coenzyme Q10 400 mg Cap Take 1 capsule by mouth once daily.      cyanocobalamin 1,000 mcg/mL injection Inject 1 mL (1,000 mcg total) into the muscle every 30 days.  "(Patient not taking: Reported on 10/26/2022) 10 mL 1    dutasteride (AVODART) 0.5 mg capsule Take 0.5 mg by mouth once daily.      IVERMECTIN ORAL TAKE 2 CAPSULES PLACE TWICE A WEEK      multivitamin-zinc oxide 7.5 mg Chew Take 1 tablet by mouth once daily. Life extension mix      propranoloL (INDERAL) 20 MG tablet Take 1 tablet (20 mg total) by mouth 2 (two) times daily as needed (panic or anxiousness). (Patient not taking: Reported on 10/26/2022) 60 tablet 0    telmisartan (MICARDIS) 40 MG Tab Take 20 mg by mouth once daily. Pt uses as needed per bp      testosterone cypionate (DEPOTESTOTERONE CYPIONATE) 100 mg/mL injection Inject 1 mL (100 mg total) into the muscle once a week. (Patient not taking: Reported on 10/31/2022) 10 mL 0    triamcinolone acetonide 0.1% (KENALOG) 0.1 % cream 2 (two) times daily. Apply to affected area       No current facility-administered medications on file prior to visit.       Exam:  BP (!) 143/78 (BP Location: Right arm, Patient Position: Sitting)   Pulse 61   Ht 5' 11" (1.803 m)   BMI 21.80 kg/m²     Constitutional  Well-developed, well-nourished, appears stated age   Cardiovascular  No LE edema bilaterally   Neurological    * Mental status  MOCA = not done during today's visit     - Orientation  Oriented to conversation     - Memory   Intact recent and remote     - Attention/concentration  Attentive, vigilant during exam     - Language  Intact to conversation.     - Fund of knowledge  Aware of current events     - Executive  Somewhat disorganized thinking, repeats himself      - Other     * Cranial nerves       - CN II  Pupils equal, visual fields full to confrontation     - CN III, IV, VI  Extraocular movements full, normal pursuits and saccades     - CN V  Sensation V1 - V3 intact     - CN VII  Face strong and symmetric bilaterally     - CN VIII  Hearing intact bilaterally     - CN IX, X  Palate raises midline and symmetric     - CN XI  SCM and trapezius 5/5 bilaterally     - " CN XII  Tongue midline   * Motor  Muscle bulk normal, strength 5/5 throughout   * Sensory   Intact to light touch throughout   * Coordination  No dysmetria with finger-to-nose    * Gait  See below.   * Deep tendon reflexes  2+ and symmetric throughout, 3+ with some cross adduction bilaterally at the knees    Negative monsivais    * Specialized movement exam Gen: masked facies and reduced blink   Speech: hypophonic  Tremor: no tremor noted  Bradykinesia: mild slowness involving the right hand>foot with finger/toe taps, mild decrement   Tone: mildly increased tone in the RUE vs paratonia due to pain at the shoulder   Gait: walks bowlegged, no shuffling noted except mildly with turns, slight reduced arm swing on the right, no tremor, able to tandem without issue  Pull Test: no postural instability     Medical Record Review:  Labs, imaging and prior notes reviewed independently.       MRI Brain 10/24/2022     Scattered foci of remote gliosis supratentorial and central cerebellar peduncle areas.  Microvascular ischemic change etiology favored.     2. Additional remote findings above including severe DJD right TMJ.    CT C/A/P 10/24/2022    Mild dilatation of the ascending aorta.  Otherwise no significant abnormality seen.  This is stable compared to the CT calcium score 09/04/2014.     2. Small liver lesions most likely cysts, and a possible right renal cyst.       Diagnoses:          1. Unstable gait  Ambulatory referral/consult to Neurology    slow shuffling gate visualized (pt is bowlegged however) - risk of falling       2. Abnormal weight loss  Ambulatory referral/consult to Neurology      3. Tremor of unknown origin- appears essential   Ambulatory referral/consult to Neurology      4. Depression, unspecified depression type        5. 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  He does have some very subtle parkinsonism on examination today given his masked facies, slightly increased tone on the right and some mild bradykinesia on his right side more so than left. He also has mildly reduced arm swing on the right however has chronic right shoulder pain which limits his ROM. His motor symptoms and cognitive symptoms began within 1 year of each other. He and his wife feel that his symptoms have come on rather quickly however he does have a history of depression with suicidality from several years back. He is currently under the care of a psychiatrist and was recently started on Sertraline (3 weeks ago). He has not yet found this to be effective but has an appointment with psychiatry tomorrow. As for his weight loss this may be related more to his depression than anything. His MRI does not show anything concerning for prion disease. His malignancy workup has been unrevealing. While it is possible that his clinical picture may be consistent with an early atypical parkinsonism such as LBD I would prefer that his mood symptoms be better managed and I see him in clinic again before committing to this diagnosis. We have agreed to the following plan today.     Plan:  - Depression/Paranoia/OCD like behaviors- Continue Sertraline 50mg daily as he has been on this medication for < 4 weeks. Has Psychiatry appointment tomorrow. He has expressed SI but with no active plan. We discussed locking up any firearms within the home and his wife agreed to help with this. We also discussed a safety plan should he become actively suicidal which he adamantly denied today.   - Will place an order for formal memory testing to better understand the cause of his cognitive issues.  - Parkinsonism - We will continue to monitor over time. Encouraged regular exercise and given a hand out on this.   - Constipation - Increase hydration (currently drinking 1 glass/day). This will also help reduce risk of UTI. Can use PRN  Miralax as needed. Provided a handout with dietary changes to assist.     RTC in 3 months to see me.     Total time: 100 minutes spent on the encounter, which includes face to face time and non-face to face time preparing to see the patient (eg, review of tests), Obtaining and/or reviewing separately obtained history, Documenting clinical information in the electronic or other health record, Independently interpreting results (not separately reported) and communicating results to the patient/family/caregiver, or Care coordination (not separately reported).     Asuncion Day MD  Division of Movement and Memory Disorders  [facility]  [phone]

## 2022-11-01 ENCOUNTER — TELEPHONE (OUTPATIENT)
Dept: INTERNAL MEDICINE | Facility: CLINIC | Age: 72
End: 2022-11-01
Payer: COMMERCIAL

## 2022-11-02 NOTE — TELEPHONE ENCOUNTER
Please help   [facility] pt missed a very much needed psychiatry office visit by myself and the neurologist.  We really need his generalized anxiety controlled and to be able to differentiate whether he has parkinsonian disease or not.  He truly accidentally missed his appointment with    PLEASE CALL THE PATIENT AND HELP SET HIM UP WITH A NEW PSYCHIATRIST OR DR. LEAL AGAIN OR ANYONE THAT WILL SEE HIM WITHIN THE NEXT 4-7 DAYS PLEASE  CALL HIS WIFE RICHARD TO MAKE APPT

## 2022-11-02 NOTE — TELEPHONE ENCOUNTER
We cannot schedule these appointments. He will need to call and reschedule. It is not something we can do, it is up to the patient.

## 2022-11-03 ENCOUNTER — TELEPHONE (OUTPATIENT)
Dept: INTERNAL MEDICINE | Facility: CLINIC | Age: 72
End: 2022-11-03
Payer: COMMERCIAL

## 2022-11-03 ENCOUNTER — TELEPHONE (OUTPATIENT)
Dept: NEUROLOGY | Facility: CLINIC | Age: 72
End: 2022-11-03
Payer: COMMERCIAL

## 2022-11-03 ENCOUNTER — HOSPITAL ENCOUNTER (EMERGENCY)
Facility: HOSPITAL | Age: 72
Discharge: HOME | End: 2022-11-03
Attending: EMERGENCY MEDICINE
Payer: COMMERCIAL

## 2022-11-03 VITALS
HEIGHT: 71 IN | RESPIRATION RATE: 18 BRPM | WEIGHT: 156 LBS | DIASTOLIC BLOOD PRESSURE: 77 MMHG | TEMPERATURE: 98 F | OXYGEN SATURATION: 99 % | SYSTOLIC BLOOD PRESSURE: 160 MMHG | HEART RATE: 66 BPM | BODY MASS INDEX: 21.84 KG/M2

## 2022-11-03 DIAGNOSIS — G20.C PARKINSONISM, UNSPECIFIED PARKINSONISM TYPE: ICD-10-CM

## 2022-11-03 DIAGNOSIS — R41.0 CONFUSION: Primary | ICD-10-CM

## 2022-11-03 DIAGNOSIS — F68.8 PERSONALITY CHANGE IN ADULT: ICD-10-CM

## 2022-11-03 DIAGNOSIS — I69.911 MEMORY DEFICIT FOLLOWING UNSPECIFIED CEREBROVASCULAR DISEASE: ICD-10-CM

## 2022-11-03 DIAGNOSIS — R53.1 WEAKNESS: Primary | ICD-10-CM

## 2022-11-03 LAB
ALBUMIN SERPL BCP-MCNC: 4 G/DL (ref 3.5–5.2)
ALP SERPL-CCNC: 48 U/L (ref 55–135)
ALT SERPL W/O P-5'-P-CCNC: 35 U/L (ref 10–44)
ANION GAP SERPL CALC-SCNC: 10 MMOL/L (ref 8–16)
AST SERPL-CCNC: 25 U/L (ref 10–40)
BASOPHILS # BLD AUTO: 0.04 K/UL (ref 0–0.2)
BASOPHILS NFR BLD: 0.4 % (ref 0–1.9)
BILIRUB SERPL-MCNC: 1 MG/DL (ref 0.1–1)
BILIRUB UR QL STRIP: NEGATIVE
BUN SERPL-MCNC: 19 MG/DL (ref 8–23)
CALCIUM SERPL-MCNC: 9.8 MG/DL (ref 8.7–10.5)
CHLORIDE SERPL-SCNC: 97 MMOL/L (ref 95–110)
CLARITY UR REFRACT.AUTO: CLEAR
CO2 SERPL-SCNC: 26 MMOL/L (ref 23–29)
COLOR UR AUTO: COLORLESS
CREAT SERPL-MCNC: 1.2 MG/DL (ref 0.5–1.4)
DIFFERENTIAL METHOD: ABNORMAL
EOSINOPHIL # BLD AUTO: 0 K/UL (ref 0–0.5)
EOSINOPHIL NFR BLD: 0.4 % (ref 0–8)
ERYTHROCYTE [DISTWIDTH] IN BLOOD BY AUTOMATED COUNT: 15.5 % (ref 11.5–14.5)
EST. GFR  (NO RACE VARIABLE): >60 ML/MIN/1.73 M^2
ETHANOL SERPL-MCNC: <10 MG/DL
FOLATE SERPL-MCNC: 8.8 NG/ML (ref 4–24)
GLUCOSE SERPL-MCNC: 104 MG/DL (ref 70–110)
GLUCOSE UR QL STRIP: NEGATIVE
HCT VFR BLD AUTO: 47.5 % (ref 40–54)
HCV AB SERPL QL IA: NORMAL
HGB BLD-MCNC: 16.1 G/DL (ref 14–18)
HGB UR QL STRIP: NEGATIVE
IMM GRANULOCYTES # BLD AUTO: 0.05 K/UL (ref 0–0.04)
IMM GRANULOCYTES NFR BLD AUTO: 0.6 % (ref 0–0.5)
KETONES UR QL STRIP: NEGATIVE
LACTATE SERPL-SCNC: 1.8 MMOL/L (ref 0.5–2.2)
LEUKOCYTE ESTERASE UR QL STRIP: NEGATIVE
LYMPHOCYTES # BLD AUTO: 1 K/UL (ref 1–4.8)
LYMPHOCYTES NFR BLD: 10.9 % (ref 18–48)
MAGNESIUM SERPL-MCNC: 1.8 MG/DL (ref 1.6–2.6)
MCH RBC QN AUTO: 29.8 PG (ref 27–31)
MCHC RBC AUTO-ENTMCNC: 33.9 G/DL (ref 32–36)
MCV RBC AUTO: 88 FL (ref 82–98)
MONOCYTES # BLD AUTO: 0.7 K/UL (ref 0.3–1)
MONOCYTES NFR BLD: 7.5 % (ref 4–15)
NEUTROPHILS # BLD AUTO: 7.1 K/UL (ref 1.8–7.7)
NEUTROPHILS NFR BLD: 80.2 % (ref 38–73)
NITRITE UR QL STRIP: NEGATIVE
NRBC BLD-RTO: 0 /100 WBC
PH UR STRIP: 7 [PH] (ref 5–8)
PLATELET # BLD AUTO: 240 K/UL (ref 150–450)
PMV BLD AUTO: 9.3 FL (ref 9.2–12.9)
POTASSIUM SERPL-SCNC: 4.3 MMOL/L (ref 3.5–5.1)
PROT SERPL-MCNC: 6.9 G/DL (ref 6–8.4)
PROT UR QL STRIP: NEGATIVE
RBC # BLD AUTO: 5.41 M/UL (ref 4.6–6.2)
SODIUM SERPL-SCNC: 133 MMOL/L (ref 136–145)
SP GR UR STRIP: 1 (ref 1–1.03)
TROPONIN I SERPL DL<=0.01 NG/ML-MCNC: 0.01 NG/ML (ref 0–0.03)
URN SPEC COLLECT METH UR: ABNORMAL
VIT B12 SERPL-MCNC: 1348 PG/ML (ref 210–950)
WBC # BLD AUTO: 8.89 K/UL (ref 3.9–12.7)

## 2022-11-03 PROCEDURE — 82077 ASSAY SPEC XCP UR&BREATH IA: CPT | Performed by: EMERGENCY MEDICINE

## 2022-11-03 PROCEDURE — 83605 ASSAY OF LACTIC ACID: CPT | Performed by: EMERGENCY MEDICINE

## 2022-11-03 PROCEDURE — 85025 COMPLETE CBC W/AUTO DIFF WBC: CPT | Performed by: EMERGENCY MEDICINE

## 2022-11-03 PROCEDURE — 63600175 PHARM REV CODE 636 W HCPCS: Performed by: EMERGENCY MEDICINE

## 2022-11-03 PROCEDURE — 96360 HYDRATION IV INFUSION INIT: CPT

## 2022-11-03 PROCEDURE — 86803 HEPATITIS C AB TEST: CPT | Performed by: PHYSICIAN ASSISTANT

## 2022-11-03 PROCEDURE — 81003 URINALYSIS AUTO W/O SCOPE: CPT | Performed by: EMERGENCY MEDICINE

## 2022-11-03 PROCEDURE — 99285 EMERGENCY DEPT VISIT HI MDM: CPT | Mod: 25

## 2022-11-03 PROCEDURE — 83735 ASSAY OF MAGNESIUM: CPT | Performed by: EMERGENCY MEDICINE

## 2022-11-03 PROCEDURE — 82746 ASSAY OF FOLIC ACID SERUM: CPT | Performed by: EMERGENCY MEDICINE

## 2022-11-03 PROCEDURE — 99284 EMERGENCY DEPT VISIT MOD MDM: CPT | Mod: ,,, | Performed by: EMERGENCY MEDICINE

## 2022-11-03 PROCEDURE — 99284 PR EMERGENCY DEPT VISIT,LEVEL IV: ICD-10-PCS | Mod: ,,, | Performed by: EMERGENCY MEDICINE

## 2022-11-03 PROCEDURE — 82607 VITAMIN B-12: CPT | Performed by: EMERGENCY MEDICINE

## 2022-11-03 PROCEDURE — 80053 COMPREHEN METABOLIC PANEL: CPT | Performed by: EMERGENCY MEDICINE

## 2022-11-03 PROCEDURE — 84484 ASSAY OF TROPONIN QUANT: CPT | Performed by: EMERGENCY MEDICINE

## 2022-11-03 RX ADMIN — SODIUM CHLORIDE, SODIUM LACTATE, POTASSIUM CHLORIDE, AND CALCIUM CHLORIDE 1000 ML: .6; .31; .03; .02 INJECTION, SOLUTION INTRAVENOUS at 11:11

## 2022-11-03 NOTE — ED PROVIDER NOTES
Encounter Date: 11/3/2022       History     Chief Complaint   Patient presents with    Multiple complaints     30lb wt loss, alt mental status change for month, no taste smell, loss of vision, shuffle gate , uti on antibiotics, headache     72-year-old male, history of hypertension, SVT, brought in by caregiver secondary to wife's concern about patient's progressive health decline over the last few weeks in addition to an episode that happened this morning.  Wife reports that the patient has continued to lose weight has not been eating and has continued to have difficulty sleeping at night despite taking both Valium and trazodone.  This morning she found him awake but with his face against the counter and confused.  The patient told her that his legs and arms felt numb and that he was unable to walk.  She was able to move him to the bed.  He was also complaining of a severe headache at 1 point.  The patient's wife then went to work and the caregiver decided to bring the patient to the emergency department as the patient apparently told her he felt like he was having a stroke.  He had no focal weakness or numbness and he had no new speech or language issues.  Of note the patient has had an extensive workup in the last few weeks for progressive weight loss, confusion, worsening psychiatric symptoms and some psychomotor findings.  He was evaluated by Internal Medicine, Neurology, Psychiatry, urology.  He has had a CT chest abdomen and pelvis to assess for malignancy but that was unremarkable and he had an MRI of his brain this past week that was also unremarkable.  He is taking antibiotics for a UTI though his repeat culture done 6 days ago was negative for bacterial growth.  On my assessment the patient reports that all the symptoms that prompted his ED visit today have resolved, that he currently has no headache, and he has some mild paresthesias to his legs but no weakness and he feels that he can walk at  baseline.    The history is provided by the patient and the spouse. The history is limited by the condition of the patient.   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    Empty sella     Family history of ischemic heart disease 1/14/2014    Hypertension     Hypogonadism male 2004    Hypothyroidism 1/14/2014    Diagnosed 2003.    Nonspecific abnormal results of endocrine function study 6/20/2014    Statin intolerance     SVT (supraventricular tachycardia)      Past Surgical History:   Procedure Laterality Date    HAIR TRANSPLANT      TONSILLECTOMY, ADENOIDECTOMY  1957     Family History   Problem Relation Age of Onset    Cancer Mother 58        breast mets to lung    Heart disease Father     Cancer Sister     Thyroid disease Sister     Cancer Brother         hodgkins lymphoma    Cancer Brother         bladder    Diabetes Son         type 1     Social History     Tobacco Use    Smoking status: Never    Smokeless tobacco: Never   Substance Use Topics    Alcohol use: Yes     Alcohol/week: 6.0 standard drinks     Types: 4 Glasses of wine, 2 Shots of liquor per week     Comment: Socially  3-4x week    Drug use: No     Review of Systems   Constitutional:  Positive for activity change, appetite change and unexpected weight change. Negative for fever.   Respiratory:  Negative for shortness of breath.    Cardiovascular:  Negative for chest pain.   Gastrointestinal:  Positive for constipation. Negative for abdominal pain.   Neurological:  Positive for weakness, numbness and headaches. Negative for tremors, seizures, syncope, facial asymmetry, speech difficulty and light-headedness.   All other systems reviewed and are negative.    Physical Exam      Initial Vitals [11/03/22 1017]   BP Pulse Resp Temp SpO2   (!) 177/79 (!) 57 18 97.7 °F (36.5 °C) 98 %      MAP       --         Physical Exam    Nursing note and vitals reviewed.  Constitutional: He is not diaphoretic. He is cooperative. He does not appear ill. No distress.   Patient appears thin though not cachectic   HENT:   Mucous membranes are dry   Eyes: Conjunctivae are normal.   Neck: Neck supple.   Cardiovascular:  Normal rate and regular rhythm.           Pulses:       Dorsalis pedis pulses are 2+ on the right side and 2+ on the left side.   Pulmonary/Chest: No respiratory distress.   Abdominal: Abdomen is soft. He exhibits no distension. There is no abdominal tenderness.   Musculoskeletal:      Cervical back: Neck supple.     Neurological: He is alert and oriented to person, place, and time. He has normal strength. No cranial nerve deficit. He exhibits abnormal muscle tone. Coordination and gait normal.   Reflex Scores:       Patellar reflexes are 3+ on the right side and 3+ on the left side.  Decreased sensation to light touch over his lower extremities bilaterally    Slight increased tone to his upper extremities bilaterally    Patient able to answer all of my questions appropriately   Skin: Skin is warm and dry. No pallor.       ED Course   Procedures  Labs Reviewed   COMPREHENSIVE METABOLIC PANEL - Abnormal; Notable for the following components:       Result Value    Sodium 133 (*)     Alkaline Phosphatase 48 (*)     All other components within normal limits   CBC W/ AUTO DIFFERENTIAL - Abnormal; Notable for the following components:    RDW 15.5 (*)     Immature Granulocytes 0.6 (*)     Immature Grans (Abs) 0.05 (*)     Gran % 80.2 (*)     Lymph % 10.9 (*)     All other components within normal limits   URINALYSIS, REFLEX TO URINE CULTURE - Abnormal; Notable for the following components:    Color, UA Colorless (*)     All other components within normal limits    Narrative:     Specimen  Source->Urine   VITAMIN B12 - Abnormal; Notable for the following components:    Vitamin B-12 1348 (*)     All other components within normal limits   HEPATITIS C ANTIBODY    Narrative:     Release to patient->Immediate   MAGNESIUM   FOLATE   ALCOHOL,MEDICAL (ETHANOL)   LACTIC ACID, PLASMA   TROPONIN I          Imaging Results              CT Head Without Contrast (Final result)  Result time 11/03/22 12:59:36      Final result by Jovanni Zhu MD (11/03/22 12:59:36)                   Impression:      No evidence of acute hemorrhage or major vascular distribution infarct as above.    Further workup as warranted clinically.      Electronically signed by: Jovanni Zhu MD  Date:    11/03/2022  Time:    12:59               Narrative:    EXAMINATION:  CT HEAD WITHOUT CONTRAST    CLINICAL HISTORY:  Mental status change, unknown cause;    TECHNIQUE:  Low dose axial CT images obtained throughout the head without the use of intravenous contrast.  Axial, sagittal and coronal reconstructions were performed.    COMPARISON:  MRI 10/26/2022, CT 10/21/2022    FINDINGS:  Intracranial compartment:    Ventricles and sulci are stable in size, without evidence of hydrocephalus.    The brain parenchyma appears stable.  No new parenchymal mass, hemorrhage, edema or major vascular distribution infarct.    No extra-axial blood or fluid collections.    Skull/extracranial contents (limited evaluation):    No displaced calvarial fracture.    Degenerative change of the right TMJ.    The mastoid air cells are clear.  Left maxillary mucous retention cyst.                                       Medications   lactated ringers bolus 1,000 mL (0 mLs Intravenous Stopped 11/3/22 1300)     Medical Decision Making:   History:   Old Medical Records: I decided to obtain old medical records.  Initial Assessment:   Patient is nontoxic appearing with stable vital signs  His neurologic exam seems to be at his baseline when I compare it to his recent  neurology evaluation  Differential Diagnosis:   In terms of the patient's progressive cognitive and overall health decline, the differential diagnosis would be broad and would include but not limited would metabolic crisis/electrolyte derangement, infection, CNS process like ICH or CVA, seizures/post-ictal state or less likely NCSE, polypharmacy, drug overdose/intoxication, withdrawal syndrome, vitamin deficiency, hepatic encephalopathy, uremia.    I have a lower suspicion for an acute process happening today given the long duration of the symptoms.    Clinical Tests:   Lab Tests: Ordered and Reviewed  Radiological Study: Ordered and Reviewed  Medical Tests: Reviewed and Ordered  ED Management:  Will check patient's labs, rule out signs of severe dehydration, electrolyte disturbances, uremia, liver disease  CT head given report of severe headache earlier today  He does seem to be back to his baseline however and given that he has already had an extensive outpatient workup for these symptoms and there are plans to continue this workup in the outpatient setting, if his ED workup is unremarkable today and he is tolerating p.o., I do think it would be reasonable for him to return home and continue the workup for these symptoms as an outpatient.  Other:   I have discussed this case with another health care provider.       <> Summary of the Discussion: Patient's PCP    ED workup unrevealing for any acute process.  Patient has great outpatient follow-up, is already being extensively worked up for these symptoms, so I do not think that he needs to be admitted to the hospital.  I am worried about the possibility of polypharmacy in this older patient to is on multiple psychotropic medications.  It looks like he has had 2 different prescriptions for benzodiazepines filled in the last month.           ED Course as of 11/03/22 1730   Thu Nov 03, 2022   1237 Alcohol, Serum: <10 [AS]   1238 Magnesium: 1.8 [AS]   1238 Sodium(!):  133 [AS]   1238 BUN: 19 [AS]   1238 Creatinine: 1.2 [AS]   1238 Lactate, [place]: 1.8 [AS]      ED Course User Index  [AS] Stefani Hartley MD                 Clinical Impression:   Final diagnoses:  [R53.1] Weakness (Primary)      ED Disposition Condition    Discharge Stable          ED Prescriptions    None       Follow-up Information       Follow up With Specialties Details Why Contact Info    Casandra Chairez MD Internal Medicine Schedule an appointment as soon as possible for a visit   [address] [facility] [id number]  [phone]               Stefani Hartley MD  11/03/22 5984

## 2022-11-03 NOTE — ED NOTES
Patient identifiers for Fernie Maldonado Dr. 72 y.o. male checked and correct.  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    Empty sella     Family history of ischemic heart disease 1/14/2014    Hypertension     Hypogonadism male 2004    Hypothyroidism 1/14/2014    Diagnosed 2003.    Nonspecific abnormal results of endocrine function study 6/20/2014    Statin intolerance     SVT (supraventricular tachycardia)      Allergies reported: Review of patient's allergies indicates:  No Known Allergies      LOC: Patient is awake, alert, and aware of environment with an appropriate affect. Patient is oriented x 4 and speaking appropriately.  APPEARANCE: Patient resting comfortably and in no acute distress. Patient is clean and well groomed, patient's clothing is properly fastened.  HEENT: endorses chronic HA, loss of smell  SKIN: The skin is warm and dry. Patient has normal skin turgor and moist mucus membranes.   MUSKULOSKELETAL: Patient is moving all extremities well, no obvious deformities noted. Pulses intact.   RESPIRATORY: Airway is open and patent. Respirations are spontaneous and non-labored with normal effort and rate.  CARDIAC: Patient has a normal rate and rhythm.  No peripheral edema noted.   ABDOMEN: No distention noted. Soft and non-tender upon palpation. Urinary incontinence, dx UTI on ATBx.   NEUROLOGICAL: pupils 3mm, PERRL. Facial expression is symmetrical. Hand grasps are equal bilaterally. Normal sensation in all extremities when touched with finger.

## 2022-11-03 NOTE — ED NOTES
"Pt to ED with multiple complaints, upon entering room pt states to MD "I'm totally fine, shouldn't have came here". A&Ox4, respirations even et unlabored, MILO. Caregiver at bedside, bed in low locked position, will continue to monitor.  "

## 2022-11-03 NOTE — TELEPHONE ENCOUNTER
----- Message from Tremontana Chevalier sent at 11/3/2022 11:31 AM CDT -----  Regarding: appt  Pt calling to schedule a neuro psychiatrics appt with Dr. Ulloa from referral in [facility]. Unable to schedule. Pls call . Pt req expedited, says pt not well.

## 2022-11-04 ENCOUNTER — TELEPHONE (OUTPATIENT)
Dept: NEUROLOGY | Facility: CLINIC | Age: 72
End: 2022-11-04
Payer: COMMERCIAL

## 2022-11-04 NOTE — TELEPHONE ENCOUNTER
----- Message from Tremontana Chevalier sent at 11/3/2022  4:32 PM CDT -----  Regarding: appt  Pt wife says she is returning a call to schedule a neuro psychiatrics appt with Dr. Ulloa from referral in [facility]. Unable to schedule. Pls call  (pls call this numb 1st), if no answer pls call [phone].

## 2022-11-07 ENCOUNTER — TELEPHONE (OUTPATIENT)
Dept: NEUROLOGY | Facility: CLINIC | Age: 72
End: 2022-11-07
Payer: COMMERCIAL

## 2022-11-07 DIAGNOSIS — E05.80 IATROGENIC HYPERTHYROIDISM: ICD-10-CM

## 2022-11-07 DIAGNOSIS — F41.1 GAD (GENERALIZED ANXIETY DISORDER): ICD-10-CM

## 2022-11-07 RX ORDER — SERTRALINE HYDROCHLORIDE 100 MG/1
100 TABLET, FILM COATED ORAL DAILY
Qty: 30 TABLET | Refills: 1 | Status: SHIPPED | OUTPATIENT
Start: 2022-11-07 | End: 2022-11-10 | Stop reason: SDUPTHER

## 2022-11-07 RX ORDER — TRAZODONE HYDROCHLORIDE 100 MG/1
100 TABLET ORAL NIGHTLY
Qty: 30 TABLET | Refills: 1 | Status: SHIPPED | OUTPATIENT
Start: 2022-11-07 | End: 2022-11-10

## 2022-11-07 NOTE — TELEPHONE ENCOUNTER
Spoke with pt about scheduling his 3mo f/u with Dr. Day in Feb. Pt asked to forgo scheduling at this time. Let pt know that I would make a note to touch base with him later on to see if he would like to move forward with scheduling

## 2022-11-10 ENCOUNTER — OFFICE VISIT (OUTPATIENT)
Dept: PSYCHIATRY | Facility: CLINIC | Age: 72
End: 2022-11-10
Payer: COMMERCIAL

## 2022-11-10 VITALS
HEIGHT: 71 IN | WEIGHT: 144.38 LBS | DIASTOLIC BLOOD PRESSURE: 74 MMHG | BODY MASS INDEX: 20.21 KG/M2 | SYSTOLIC BLOOD PRESSURE: 152 MMHG | HEART RATE: 63 BPM

## 2022-11-10 DIAGNOSIS — G47.00 INSOMNIA DISORDER WITH NON-SLEEP DISORDER MENTAL COMORBIDITY: ICD-10-CM

## 2022-11-10 DIAGNOSIS — F41.1 GENERALIZED ANXIETY DISORDER: ICD-10-CM

## 2022-11-10 DIAGNOSIS — F32.3: Primary | ICD-10-CM

## 2022-11-10 PROCEDURE — 3008F PR BODY MASS INDEX (BMI) DOCUMENTED: ICD-10-PCS | Mod: CPTII,S$GLB,, | Performed by: NURSE PRACTITIONER

## 2022-11-10 PROCEDURE — 3008F BODY MASS INDEX DOCD: CPT | Mod: CPTII,S$GLB,, | Performed by: NURSE PRACTITIONER

## 2022-11-10 PROCEDURE — 1160F RVW MEDS BY RX/DR IN RCRD: CPT | Mod: CPTII,S$GLB,, | Performed by: NURSE PRACTITIONER

## 2022-11-10 PROCEDURE — 1159F PR MEDICATION LIST DOCUMENTED IN MEDICAL RECORD: ICD-10-PCS | Mod: CPTII,S$GLB,, | Performed by: NURSE PRACTITIONER

## 2022-11-10 PROCEDURE — 3077F SYST BP >= 140 MM HG: CPT | Mod: CPTII,S$GLB,, | Performed by: NURSE PRACTITIONER

## 2022-11-10 PROCEDURE — 4010F PR ACE/ARB THEARPY RXD/TAKEN: ICD-10-PCS | Mod: CPTII,S$GLB,, | Performed by: NURSE PRACTITIONER

## 2022-11-10 PROCEDURE — 4010F ACE/ARB THERAPY RXD/TAKEN: CPT | Mod: CPTII,S$GLB,, | Performed by: NURSE PRACTITIONER

## 2022-11-10 PROCEDURE — 1159F MED LIST DOCD IN RCRD: CPT | Mod: CPTII,S$GLB,, | Performed by: NURSE PRACTITIONER

## 2022-11-10 PROCEDURE — 3078F DIAST BP <80 MM HG: CPT | Mod: CPTII,S$GLB,, | Performed by: NURSE PRACTITIONER

## 2022-11-10 PROCEDURE — 99999 PR PBB SHADOW E&M-EST. PATIENT-LVL IV: ICD-10-PCS | Mod: PBBFAC,,, | Performed by: NURSE PRACTITIONER

## 2022-11-10 PROCEDURE — 1160F PR REVIEW ALL MEDS BY PRESCRIBER/CLIN PHARMACIST DOCUMENTED: ICD-10-PCS | Mod: CPTII,S$GLB,, | Performed by: NURSE PRACTITIONER

## 2022-11-10 PROCEDURE — 99999 PR PBB SHADOW E&M-EST. PATIENT-LVL IV: CPT | Mod: PBBFAC,,, | Performed by: NURSE PRACTITIONER

## 2022-11-10 PROCEDURE — 3078F PR MOST RECENT DIASTOLIC BLOOD PRESSURE < 80 MM HG: ICD-10-PCS | Mod: CPTII,S$GLB,, | Performed by: NURSE PRACTITIONER

## 2022-11-10 PROCEDURE — 3077F PR MOST RECENT SYSTOLIC BLOOD PRESSURE >= 140 MM HG: ICD-10-PCS | Mod: CPTII,S$GLB,, | Performed by: NURSE PRACTITIONER

## 2022-11-10 PROCEDURE — 99205 PR OFFICE/OUTPT VISIT, NEW, LEVL V, 60-74 MIN: ICD-10-PCS | Mod: S$GLB,,, | Performed by: NURSE PRACTITIONER

## 2022-11-10 PROCEDURE — 99205 OFFICE O/P NEW HI 60 MIN: CPT | Mod: S$GLB,,, | Performed by: NURSE PRACTITIONER

## 2022-11-10 RX ORDER — SERTRALINE HYDROCHLORIDE 100 MG/1
100 TABLET, FILM COATED ORAL DAILY
Qty: 30 TABLET | Refills: 2 | Status: SHIPPED | OUTPATIENT
Start: 2022-11-10

## 2022-11-10 RX ORDER — MIRTAZAPINE 15 MG/1
15 TABLET, ORALLY DISINTEGRATING ORAL NIGHTLY
Qty: 30 TABLET | Refills: 11 | Status: SHIPPED | OUTPATIENT
Start: 2022-11-10 | End: 2023-11-10

## 2022-11-10 NOTE — PROGRESS NOTES
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 Met with patient and wife.    Reason for Encounter: self-referral. Patient complains of depression.    History of Present Illness:     Pt is a 72 year old male who presents for psychiatric evaluation.     Onset past 6 weeks lost 40 lbs from loss of appetite, has hx of anxiety and states he is not sleeping.     Experienced psychotic episode last Saturday of "demons were outside and flies but was hallucinations". Paranoid delusions of them losing there money.      Thinks he can't eat or swallow.  Wife states he does sleep.      Pt resistant to being on board with treatment.      Past Psychiatric History: hx of outpatient     Psychiatric Medications: currently taking  Zoloft 100 mg daily (increased today)  Trazodone 100 mg     Past trials include: none    Psychosocial History: Chiropractor with multiple offices.   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    Empty sella      Family history of ischemic heart disease 1/14/2014    Hypertension      Hypogonadism male 2004    Hypothyroidism 1/14/2014     Diagnosed 2003.    Nonspecific abnormal results of endocrine function study 6/20/2014    Statin intolerance      SVT (supraventricular tachycardia)      Review Of Systems:     GENERAL:  No weight gain or loss  SKIN:  No rashes or lacerations  HEAD:  No " "headaches  EYES:  No exophthalmos, jaundice or blindness  EARS:  No dizziness, tinnitus or hearing loss  NOSE:  No changes in smell  MOUTH & THROAT:  No dyskinetic movements or obvious goiter  CHEST:  No shortness of breath, hyperventilation or cough  CARDIOVASCULAR:  No tachycardia or chest pain  ABDOMEN:  No nausea, vomiting, pain, constipation or diarrhea  URINARY:  No frequency, dysuria or sexual dysfunction  ENDOCRINE:  No polydipsia, polyuria  MUSCULOSKELETAL:  No pain or stiffness of the joints  NEUROLOGIC:  No weakness, sensory changes, seizures, confusion, memory loss, tremor or other abnormal movements    Current Evaluation:     Nutritional Screening: Considering the patient's height and weight, medications, medical history and preferences, should a referral be made to the dietitian? no    Constitutional  Vitals:  Most recent vital signs, dated greater than 90 days prior to this appointment, were reviewed.    Vitals:    11/10/22 0907   BP: (!) 152/74   Pulse: 63   Weight: 65.5 kg (144 lb 6.4 oz)   Height: 5' 11" (1.803 m)        General:  unremarkable, age appropriate     Musculoskeletal  Muscle Strength/Tone:  not examined   Gait & Station:  non-ataxic     Psychiatric  Speech:  no latency; no press   Mood & Affect:  depressed  congruent and appropriate   Thought Process:  normal and logical   Associations:  intact   Thought Content:  normal, no suicidality, no homicidality, delusions, or paranoia   Insight:  limited awareness of illness   Judgement: behavior is adequate to circumstances   Orientation:  grossly intact   Memory: intact for content of interview   Language: grossly intact   Attention Span & Concentration:  able to focus   Fund of Knowledge:  intact and appropriate to age and level of education       Relevant Elements of Neurological Exam: needs assistance with gait    Functioning in Relationships:  Spouse/partner: see above HPI  Peers: see above HPI  Employers: see above HPI    Laboratory " Data  Admission on 11/03/2022, Discharged on 11/03/2022   Component Date Value Ref Range Status    Hepatitis C Ab 11/03/2022 Non-reactive  Non-reactive Final    Sodium 11/03/2022 133 (L)  136 - 145 mmol/L Final    Potassium 11/03/2022 4.3  3.5 - 5.1 mmol/L Final    Chloride 11/03/2022 97  95 - 110 mmol/L Final    CO2 11/03/2022 26  23 - 29 mmol/L Final    Glucose 11/03/2022 104  70 - 110 mg/dL Final    BUN 11/03/2022 19  8 - 23 mg/dL Final    Creatinine 11/03/2022 1.2  0.5 - 1.4 mg/dL Final    Calcium 11/03/2022 9.8  8.7 - 10.5 mg/dL Final    Total Protein 11/03/2022 6.9  6.0 - 8.4 g/dL Final    Albumin 11/03/2022 4.0  3.5 - 5.2 g/dL Final    Total Bilirubin 11/03/2022 1.0  0.1 - 1.0 mg/dL Final    Alkaline Phosphatase 11/03/2022 48 (L)  55 - 135 U/L Final    AST 11/03/2022 25  10 - 40 U/L Final    ALT 11/03/2022 35  10 - 44 U/L Final    Anion Gap 11/03/2022 10  8 - 16 mmol/L Final    eGFR 11/03/2022 >60.0  >60 mL/min/1.73 m^2 Final    WBC 11/03/2022 8.89  3.90 - 12.70 K/uL Final    RBC 11/03/2022 5.41  4.60 - 6.20 M/uL Final    Hemoglobin 11/03/2022 16.1  14.0 - 18.0 g/dL Final    Hematocrit 11/03/2022 47.5  40.0 - 54.0 % Final    MCV 11/03/2022 88  82 - 98 fL Final    MCH 11/03/2022 29.8  27.0 - 31.0 pg Final    MCHC 11/03/2022 33.9  32.0 - 36.0 g/dL Final    RDW 11/03/2022 15.5 (H)  11.5 - 14.5 % Final    Platelets 11/03/2022 240  150 - 450 K/uL Final    MPV 11/03/2022 9.3  9.2 - 12.9 fL Final    Immature Granulocytes 11/03/2022 0.6 (H)  0.0 - 0.5 % Final    Gran # (ANC) 11/03/2022 7.1  1.8 - 7.7 K/uL Final    Immature Grans (Abs) 11/03/2022 0.05 (H)  0.00 - 0.04 K/uL Final    Lymph # 11/03/2022 1.0  1.0 - 4.8 K/uL Final    Mono # 11/03/2022 0.7  0.3 - 1.0 K/uL Final    Eos # 11/03/2022 0.0  0.0 - 0.5 K/uL Final    Baso # 11/03/2022 0.04  0.00 - 0.20 K/uL Final    nRBC 11/03/2022 0  0 /100 WBC Final    Gran % 11/03/2022 80.2 (H)  38.0 - 73.0 % Final    Lymph % 11/03/2022 10.9 (L)  18.0 - 48.0 % Final    Mono %  11/03/2022 7.5  4.0 - 15.0 % Final    Eosinophil % 11/03/2022 0.4  0.0 - 8.0 % Final    Basophil % 11/03/2022 0.4  0.0 - 1.9 % Final    Differential Method 11/03/2022 Automated   Final    Magnesium 11/03/2022 1.8  1.6 - 2.6 mg/dL Final    Specimen UA 11/03/2022 Urine, Clean Catch   Final    Color, UA 11/03/2022 Colorless (A)  Yellow, Straw, Shahnaz Final    Appearance, UA 11/03/2022 Clear  Clear Final    pH, UA 11/03/2022 7.0  5.0 - 8.0 Final    Specific Gravity, UA 11/03/2022 1.005  1.005 - 1.030 Final    Protein, UA 11/03/2022 Negative  Negative Final    Glucose, UA 11/03/2022 Negative  Negative Final    Ketones, UA 11/03/2022 Negative  Negative Final    Bilirubin (UA) 11/03/2022 Negative  Negative Final    Occult Blood UA 11/03/2022 Negative  Negative Final    Nitrite, UA 11/03/2022 Negative  Negative Final    Leukocytes, UA 11/03/2022 Negative  Negative Final    Vitamin B-12 11/03/2022 1348 (H)  210 - 950 pg/mL Final    Folate 11/03/2022 8.8  4.0 - 24.0 ng/mL Final    Alcohol, Serum 11/03/2022 <10  <10 mg/dL Final    Lactate (Lactic Acid) 11/03/2022 1.8  0.5 - 2.2 mmol/L Final    Troponin I 11/03/2022 0.008  0.000 - 0.026 ng/mL Final   Office Visit on 10/26/2022   Component Date Value Ref Range Status    Urine Culture, Routine 10/26/2022 No growth   Final   Clinical Support on 10/24/2022   Component Date Value Ref Range Status    D-Dimer 10/24/2022 0.66 (H)  <0.50 mg/L FEU Final    HIV 1/2 Ag/Ab 10/24/2022 Non-reactive  Non-reactive Final   Clinical Support on 10/21/2022   Component Date Value Ref Range Status    Troponin I 10/21/2022 0.017  0.000 - 0.026 ng/mL Final    Specimen UA 10/21/2022 Urine, Clean Catch   Final    Color, UA 10/21/2022 Yellow  Yellow, Straw, Shahnaz Final    Appearance, UA 10/21/2022 Hazy (A)  Clear Final    pH, UA 10/21/2022 6.0  5.0 - 8.0 Final    Specific Gravity, UA 10/21/2022 1.025  1.005 - 1.030 Final    Protein, UA 10/21/2022 Trace (A)  Negative Final    Glucose, UA 10/21/2022 Trace  (A)  Negative Final    Ketones, UA 10/21/2022 Trace (A)  Negative Final    Bilirubin (UA) 10/21/2022 Negative  Negative Final    Occult Blood UA 10/21/2022 Negative  Negative Final    Nitrite, UA 10/21/2022 Negative  Negative Final    Leukocytes, UA 10/21/2022 3+ (A)  Negative Final    COVID-19 (SARS CoV-2) IgG Antibody* 10/21/2022 03819.7  AU/ml Final    COVID-19 (SARS CoV-2) IgG Antibody* 10/21/2022 Positive   Final    CRP 10/21/2022 <0.3  0.0 - 8.2 mg/L Final    BNP 10/21/2022 70  0 - 99 pg/mL Final    Free T4 10/21/2022 1.02  0.71 - 1.51 ng/dL Final    TSH 10/21/2022 3.552  0.400 - 4.000 uIU/mL Final    WBC 10/21/2022 11.84  3.90 - 12.70 K/uL Final    RBC 10/21/2022 5.04  4.60 - 6.20 M/uL Final    Hemoglobin 10/21/2022 14.7  14.0 - 18.0 g/dL Final    Hematocrit 10/21/2022 44.1  40.0 - 54.0 % Final    MCV 10/21/2022 88  82 - 98 fL Final    MCH 10/21/2022 29.2  27.0 - 31.0 pg Final    MCHC 10/21/2022 33.3  32.0 - 36.0 g/dL Final    RDW 10/21/2022 15.0 (H)  11.5 - 14.5 % Final    Platelets 10/21/2022 254  150 - 450 K/uL Final    MPV 10/21/2022 9.3  9.2 - 12.9 fL Final    Immature Granulocytes 10/21/2022 0.8 (H)  0.0 - 0.5 % Final    Gran # (ANC) 10/21/2022 10.0 (H)  1.8 - 7.7 K/uL Final    Immature Grans (Abs) 10/21/2022 0.09 (H)  0.00 - 0.04 K/uL Final    Lymph # 10/21/2022 0.7 (L)  1.0 - 4.8 K/uL Final    Mono # 10/21/2022 0.9  0.3 - 1.0 K/uL Final    Eos # 10/21/2022 0.0  0.0 - 0.5 K/uL Final    Baso # 10/21/2022 0.03  0.00 - 0.20 K/uL Final    nRBC 10/21/2022 0  0 /100 WBC Final    Gran % 10/21/2022 84.5 (H)  38.0 - 73.0 % Final    Lymph % 10/21/2022 6.2 (L)  18.0 - 48.0 % Final    Mono % 10/21/2022 7.9  4.0 - 15.0 % Final    Eosinophil % 10/21/2022 0.3  0.0 - 8.0 % Final    Basophil % 10/21/2022 0.3  0.0 - 1.9 % Final    Differential Method 10/21/2022 Automated   Final    Sodium 10/21/2022 137  136 - 145 mmol/L Final    Potassium 10/21/2022 4.3  3.5 - 5.1 mmol/L Final    Chloride 10/21/2022 103  95 - 110  mmol/L Final    CO2 10/21/2022 28  23 - 29 mmol/L Final    Glucose 10/21/2022 86  70 - 110 mg/dL Final    BUN 10/21/2022 29 (H)  8 - 23 mg/dL Final    Creatinine 10/21/2022 1.1  0.5 - 1.4 mg/dL Final    Calcium 10/21/2022 9.6  8.7 - 10.5 mg/dL Final    Total Protein 10/21/2022 6.1  6.0 - 8.4 g/dL Final    Albumin 10/21/2022 3.8  3.5 - 5.2 g/dL Final    Total Bilirubin 10/21/2022 1.0  0.1 - 1.0 mg/dL Final    Alkaline Phosphatase 10/21/2022 40 (L)  55 - 135 U/L Final    AST 10/21/2022 27  10 - 40 U/L Final    ALT 10/21/2022 27  10 - 44 U/L Final    Anion Gap 10/21/2022 6 (L)  8 - 16 mmol/L Final    eGFR 10/21/2022 >60.0  >60 mL/min/1.73 m^2 Final    RBC, UA 10/21/2022 8 (H)  0 - 4 /hpf Final    WBC, UA 10/21/2022 >100 (H)  0 - 5 /hpf Final    WBC Clumps, UA 10/21/2022 Moderate (A)  None-Rare Final    Bacteria 10/21/2022 Moderate (A)  None-Occ /hpf Final    Squam Epithel, UA 10/21/2022 0  /hpf Final    Microscopic Comment 10/21/2022 SEE COMMENT   Final    Urine Culture, Routine 10/21/2022  (A)   Final                    Value:ENTEROCOCCUS FAECALIS  >100,000 cfu/ml     Clinical Support on 10/13/2022   Component Date Value Ref Range Status    T Uptake 10/13/2022 37  28 - 41 % Final    T3, Total 10/13/2022 48 (L)  60 - 180 ng/dL Final    TSH 10/13/2022 3.435  0.400 - 4.000 uIU/mL Final    Free T4 10/13/2022 1.03  0.71 - 1.51 ng/dL Final         Medications  Outpatient Encounter Medications as of 11/10/2022   Medication Sig Dispense Refill    amoxicillin (AMOXIL) 875 MG tablet Take 1 tablet (875 mg total) by mouth 2 (two) times daily. 20 tablet 0    amoxicillin (AMOXIL) 875 MG tablet Take 1 tablet (875 mg total) by mouth 2 (two) times daily. 28 tablet 1    diazePAM (VALIUM) 5 MG tablet Take 1 tablet (5 mg total) by mouth every 12 (twelve) hours as needed for Anxiety or Insomnia. 60 tablet 0    evolocumab (REPATHA SURECLICK) 140 mg/mL PnIj Inject into the skin every 14 (fourteen) days.      fish oil-fat acid [id number]  1,200 mg ([id number]) Cap Take 3 capsules by mouth once daily.       IVERMECTIN ORAL TAKE 2 CAPSULES PLACE TWICE A WEEK      levothyroxine (SYNTHROID) 75 MCG tablet TAKE 1 TABLET BY MOUTH BEFORE BREAKFAST. 30 tablet 1    sertraline (ZOLOFT) 100 MG tablet Take 1 tablet (100 mg total) by mouth once daily. 30 tablet 1    traZODone (DESYREL) 100 MG tablet Take 1 tablet (100 mg total) by mouth every evening. 30 tablet 1     No facility-administered encounter medications on file as of 11/10/2022.     Assessment - Diagnosis - Goals:     Impression:       ICD-10-CM ICD-9-CM   1. Major depressive disorder, single episode, severe w psychotic behavior  F32.3 296.24   2. Insomnia disorder with non-sleep disorder mental comorbidity  G47.00 780.52   3. Generalized anxiety disorder  F41.1 300.02       Strengths and Liabilities: Strength: Patient has positive support network., Liability: Patient has poor health., Liability: Patient has poor judgment, Liability: Patient is unstable., Liability: Patient has possible cognitive impairment., Liability: Patient lacks coping skills.    Treatment Goals:  Specify outcomes written in observable, behavioral terms:   Anxiety: reducing negative automatic thoughts, reducing physical symptoms of anxiety, and reducing time spent worrying (<30 minutes/day)  Depression: increasing motivation, increasing self-reward for positive behaviors (one/day), increasing self-reward for positive thoughts (one/day), increasing social contacts (three/week), reducing excessive guilt, reducing fatigue, and reducing negative automatic thoughts    Treatment Plan/Recommendations:   Medication Management: The risks and benefits of medication were discussed with the patient.  The treatment plan and follow up plan were reviewed with the patient.  Reviewed available medical records and labs  Continue Zoloft 100 mg daily (may increase next visit) May also consider Abilify  Hold Trazodone and try Remeron 15 mg  before bed (for sleep, mood, and appetite)  Referral to social work for therapy  If you feel unsafe, or a threat to self then present to emergency department for evaluation for inpatient treatment.   Follow up with me in one month by office or virtual visit  May continue Valium daily as needed at this time but do not overuse and watch for fall risks  Counseling this visit focused on building adaptive coping skills, medication teaching, and cognitive behavioral therapy (CBT)    Return to Clinic: 1 month    Counseling time: 30 minutes  Total time: 60 minutes

## 2022-11-10 NOTE — PATIENT INSTRUCTIONS
Continue Zoloft 100 mg daily  Hold Trazodone and try Remeron 15 mg before bed (for sleep, mood, and appetite)  Referral to social work for therapy  If you feel unsafe, or a threat to self then present to emergency department for evaluation for inpatient treatment.   Follow up with me in one month by office or virtual visit  May continue Valium daily as needed at this time but do not overuse and watch for fall risks

## 2022-11-18 DIAGNOSIS — F41.1 GAD (GENERALIZED ANXIETY DISORDER): ICD-10-CM

## 2022-11-18 RX ORDER — TRAZODONE HYDROCHLORIDE 100 MG/1
TABLET ORAL
Qty: 30 TABLET | Refills: 1 | OUTPATIENT
Start: 2022-11-18

## 2022-11-30 DIAGNOSIS — E05.80 IATROGENIC HYPERTHYROIDISM: ICD-10-CM

## 2022-11-30 RX ORDER — LEVOTHYROXINE SODIUM 75 UG/1
TABLET ORAL
Qty: 30 TABLET | Refills: 1 | OUTPATIENT
Start: 2022-11-30
